# Patient Record
Sex: FEMALE | Race: WHITE | NOT HISPANIC OR LATINO
[De-identification: names, ages, dates, MRNs, and addresses within clinical notes are randomized per-mention and may not be internally consistent; named-entity substitution may affect disease eponyms.]

---

## 2017-01-28 ENCOUNTER — NON-APPOINTMENT (OUTPATIENT)
Age: 47
End: 2017-01-28

## 2017-01-28 ENCOUNTER — APPOINTMENT (OUTPATIENT)
Dept: INTERNAL MEDICINE | Facility: CLINIC | Age: 47
End: 2017-01-28

## 2017-01-28 VITALS
HEART RATE: 91 BPM | RESPIRATION RATE: 14 BRPM | SYSTOLIC BLOOD PRESSURE: 100 MMHG | BODY MASS INDEX: 29.83 KG/M2 | OXYGEN SATURATION: 96 % | HEIGHT: 61 IN | WEIGHT: 158 LBS | DIASTOLIC BLOOD PRESSURE: 80 MMHG

## 2017-02-01 LAB
25(OH)D3 SERPL-MCNC: 21 NG/ML
ALBUMIN SERPL ELPH-MCNC: 4.2 G/DL
ALP BLD-CCNC: 52 U/L
ALT SERPL-CCNC: 19 U/L
ANION GAP SERPL CALC-SCNC: 16 MMOL/L
APPEARANCE: CLEAR
AST SERPL-CCNC: 20 U/L
BASOPHILS # BLD AUTO: 0.01 K/UL
BASOPHILS NFR BLD AUTO: 0.1 %
BILIRUB SERPL-MCNC: 0.8 MG/DL
BILIRUBIN URINE: NEGATIVE
BLOOD URINE: NEGATIVE
BUN SERPL-MCNC: 9 MG/DL
CALCIUM SERPL-MCNC: 9.2 MG/DL
CHLORIDE SERPL-SCNC: 101 MMOL/L
CHOLEST SERPL-MCNC: 157 MG/DL
CHOLEST/HDLC SERPL: 2.5 RATIO
CO2 SERPL-SCNC: 24 MMOL/L
COLOR: YELLOW
CREAT SERPL-MCNC: 0.78 MG/DL
EOSINOPHIL # BLD AUTO: 0.2 K/UL
EOSINOPHIL NFR BLD AUTO: 3 %
ESTIMATED AVERAGE GLUCOSE: 103 MG/DL
FOLATE SERPL-MCNC: 17.3 NG/ML
GLUCOSE QUALITATIVE U: NORMAL MG/DL
GLUCOSE SERPL-MCNC: 89 MG/DL
HBA1C MFR BLD HPLC: 5.2 %
HCT VFR BLD CALC: 42.5 %
HDLC SERPL-MCNC: 63 MG/DL
HGB BLD-MCNC: 14.7 G/DL
IMM GRANULOCYTES NFR BLD AUTO: 0.1 %
KETONES URINE: ABNORMAL
LDLC SERPL CALC-MCNC: 81 MG/DL
LEUKOCYTE ESTERASE URINE: NEGATIVE
LYMPHOCYTES # BLD AUTO: 1.73 K/UL
LYMPHOCYTES NFR BLD AUTO: 25.8 %
MAN DIFF?: NORMAL
MCHC RBC-ENTMCNC: 32 PG
MCHC RBC-ENTMCNC: 34.6 GM/DL
MCV RBC AUTO: 92.6 FL
MONOCYTES # BLD AUTO: 0.56 K/UL
MONOCYTES NFR BLD AUTO: 8.4 %
NEUTROPHILS # BLD AUTO: 4.19 K/UL
NEUTROPHILS NFR BLD AUTO: 62.6 %
NITRITE URINE: NEGATIVE
PH URINE: 6
PLATELET # BLD AUTO: 363 K/UL
POTASSIUM SERPL-SCNC: 4.6 MMOL/L
PROT SERPL-MCNC: 7 G/DL
PROTEIN URINE: NEGATIVE MG/DL
RBC # BLD: 4.59 M/UL
RBC # FLD: 12.7 %
SODIUM SERPL-SCNC: 141 MMOL/L
SPECIFIC GRAVITY URINE: 1.02
TRIGL SERPL-MCNC: 67 MG/DL
TSH SERPL-ACNC: 1.09 UIU/ML
UROBILINOGEN URINE: 1 MG/DL
VIT B12 SERPL-MCNC: 423 PG/ML
WBC # FLD AUTO: 6.7 K/UL

## 2017-02-27 ENCOUNTER — APPOINTMENT (OUTPATIENT)
Dept: COLORECTAL SURGERY | Facility: CLINIC | Age: 47
End: 2017-02-27

## 2017-05-21 ENCOUNTER — TRANSCRIPTION ENCOUNTER (OUTPATIENT)
Age: 47
End: 2017-05-21

## 2018-01-29 ENCOUNTER — APPOINTMENT (OUTPATIENT)
Dept: INTERNAL MEDICINE | Facility: CLINIC | Age: 48
End: 2018-01-29
Payer: COMMERCIAL

## 2018-01-29 VITALS
SYSTOLIC BLOOD PRESSURE: 124 MMHG | HEART RATE: 74 BPM | WEIGHT: 170 LBS | TEMPERATURE: 98.8 F | DIASTOLIC BLOOD PRESSURE: 66 MMHG | HEIGHT: 61.5 IN | OXYGEN SATURATION: 97 % | BODY MASS INDEX: 31.69 KG/M2 | RESPIRATION RATE: 14 BRPM

## 2018-01-29 DIAGNOSIS — Z87.09 PERSONAL HISTORY OF OTHER DISEASES OF THE RESPIRATORY SYSTEM: ICD-10-CM

## 2018-01-29 DIAGNOSIS — J06.9 ACUTE UPPER RESPIRATORY INFECTION, UNSPECIFIED: ICD-10-CM

## 2018-01-29 PROCEDURE — 99406 BEHAV CHNG SMOKING 3-10 MIN: CPT

## 2018-01-29 PROCEDURE — 99213 OFFICE O/P EST LOW 20 MIN: CPT | Mod: 25

## 2018-01-29 PROCEDURE — 99396 PREV VISIT EST AGE 40-64: CPT | Mod: 25

## 2018-01-30 LAB
25(OH)D3 SERPL-MCNC: 25.9 NG/ML
ALBUMIN SERPL ELPH-MCNC: 4.1 G/DL
ALP BLD-CCNC: 58 U/L
ALT SERPL-CCNC: 11 U/L
ANION GAP SERPL CALC-SCNC: 12 MMOL/L
AST SERPL-CCNC: 12 U/L
BASOPHILS # BLD AUTO: 0.03 K/UL
BASOPHILS NFR BLD AUTO: 0.4 %
BILIRUB SERPL-MCNC: 0.2 MG/DL
BUN SERPL-MCNC: 11 MG/DL
CALCIUM SERPL-MCNC: 9.5 MG/DL
CHLORIDE SERPL-SCNC: 103 MMOL/L
CHOLEST SERPL-MCNC: 169 MG/DL
CHOLEST/HDLC SERPL: 2.6 RATIO
CO2 SERPL-SCNC: 24 MMOL/L
CREAT SERPL-MCNC: 0.83 MG/DL
EOSINOPHIL # BLD AUTO: 0.3 K/UL
EOSINOPHIL NFR BLD AUTO: 4.5 %
GLUCOSE SERPL-MCNC: 95 MG/DL
HBA1C MFR BLD HPLC: 5.1 %
HCT VFR BLD CALC: 43.1 %
HDLC SERPL-MCNC: 64 MG/DL
HGB BLD-MCNC: 14.9 G/DL
HIV1+2 AB SPEC QL IA.RAPID: NONREACTIVE
IMM GRANULOCYTES NFR BLD AUTO: 0.1 %
LDLC SERPL CALC-MCNC: 96 MG/DL
LYMPHOCYTES # BLD AUTO: 1.98 K/UL
LYMPHOCYTES NFR BLD AUTO: 29.4 %
MAN DIFF?: NORMAL
MCHC RBC-ENTMCNC: 31.9 PG
MCHC RBC-ENTMCNC: 34.6 GM/DL
MCV RBC AUTO: 92.3 FL
MONOCYTES # BLD AUTO: 0.58 K/UL
MONOCYTES NFR BLD AUTO: 8.6 %
NEUTROPHILS # BLD AUTO: 3.84 K/UL
NEUTROPHILS NFR BLD AUTO: 57 %
PLATELET # BLD AUTO: 313 K/UL
POTASSIUM SERPL-SCNC: 5.1 MMOL/L
PROT SERPL-MCNC: 7.5 G/DL
RBC # BLD: 4.67 M/UL
RBC # FLD: 12.1 %
SODIUM SERPL-SCNC: 139 MMOL/L
TRIGL SERPL-MCNC: 43 MG/DL
TSH SERPL-ACNC: 1.5 UIU/ML
WBC # FLD AUTO: 6.74 K/UL

## 2018-04-05 ENCOUNTER — RX RENEWAL (OUTPATIENT)
Age: 48
End: 2018-04-05

## 2018-08-12 ENCOUNTER — TRANSCRIPTION ENCOUNTER (OUTPATIENT)
Age: 48
End: 2018-08-12

## 2019-02-09 ENCOUNTER — TRANSCRIPTION ENCOUNTER (OUTPATIENT)
Age: 49
End: 2019-02-09

## 2019-02-11 ENCOUNTER — APPOINTMENT (OUTPATIENT)
Dept: INTERNAL MEDICINE | Facility: CLINIC | Age: 49
End: 2019-02-11
Payer: COMMERCIAL

## 2019-02-11 ENCOUNTER — INPATIENT (INPATIENT)
Facility: HOSPITAL | Age: 49
LOS: 1 days | Discharge: HOME | DRG: 392 | End: 2019-02-13
Attending: INTERNAL MEDICINE | Admitting: HOSPITALIST
Payer: COMMERCIAL

## 2019-02-11 VITALS
SYSTOLIC BLOOD PRESSURE: 127 MMHG | TEMPERATURE: 99 F | WEIGHT: 212.08 LBS | OXYGEN SATURATION: 98 % | HEIGHT: 65 IN | RESPIRATION RATE: 15 BRPM | HEART RATE: 87 BPM | DIASTOLIC BLOOD PRESSURE: 91 MMHG

## 2019-02-11 VITALS
TEMPERATURE: 98.3 F | DIASTOLIC BLOOD PRESSURE: 84 MMHG | SYSTOLIC BLOOD PRESSURE: 122 MMHG | RESPIRATION RATE: 14 BRPM | WEIGHT: 180 LBS | HEART RATE: 78 BPM | HEIGHT: 61.5 IN | OXYGEN SATURATION: 97 % | BODY MASS INDEX: 33.55 KG/M2

## 2019-02-11 DIAGNOSIS — K60.3 ANAL FISTULA: Chronic | ICD-10-CM

## 2019-02-11 DIAGNOSIS — K52.9 NONINFECTIVE GASTROENTERITIS AND COLITIS, UNSPECIFIED: ICD-10-CM

## 2019-02-11 DIAGNOSIS — R10.814 LEFT LOWER QUADRANT ABDOMINAL TENDERNESS: ICD-10-CM

## 2019-02-11 DIAGNOSIS — Z29.9 ENCOUNTER FOR PROPHYLACTIC MEASURES, UNSPECIFIED: ICD-10-CM

## 2019-02-11 DIAGNOSIS — K92.2 GASTROINTESTINAL HEMORRHAGE, UNSPECIFIED: ICD-10-CM

## 2019-02-11 DIAGNOSIS — Z11.4 ENCOUNTER FOR SCREENING FOR HUMAN IMMUNODEFICIENCY VIRUS [HIV]: ICD-10-CM

## 2019-02-11 LAB
ALBUMIN SERPL ELPH-MCNC: 3.3 G/DL — SIGNIFICANT CHANGE UP (ref 3.3–5)
ALP SERPL-CCNC: 62 U/L — SIGNIFICANT CHANGE UP (ref 40–120)
ALT FLD-CCNC: 25 U/L — SIGNIFICANT CHANGE UP (ref 12–78)
ANION GAP SERPL CALC-SCNC: 7 MMOL/L — SIGNIFICANT CHANGE UP (ref 5–17)
APTT BLD: 29.1 SEC — SIGNIFICANT CHANGE UP (ref 28.5–37)
AST SERPL-CCNC: 9 U/L — LOW (ref 15–37)
BASOPHILS # BLD AUTO: 0.02 K/UL — SIGNIFICANT CHANGE UP (ref 0–0.2)
BASOPHILS NFR BLD AUTO: 0.2 % — SIGNIFICANT CHANGE UP (ref 0–2)
BILIRUB SERPL-MCNC: 0.6 MG/DL — SIGNIFICANT CHANGE UP (ref 0.2–1.2)
BUN SERPL-MCNC: 8 MG/DL — SIGNIFICANT CHANGE UP (ref 7–23)
CALCIUM SERPL-MCNC: 8.4 MG/DL — LOW (ref 8.5–10.1)
CHLORIDE SERPL-SCNC: 104 MMOL/L — SIGNIFICANT CHANGE UP (ref 96–108)
CO2 SERPL-SCNC: 29 MMOL/L — SIGNIFICANT CHANGE UP (ref 22–31)
CREAT SERPL-MCNC: 0.68 MG/DL — SIGNIFICANT CHANGE UP (ref 0.5–1.3)
EOSINOPHIL # BLD AUTO: 0.19 K/UL — SIGNIFICANT CHANGE UP (ref 0–0.5)
EOSINOPHIL NFR BLD AUTO: 1.7 % — SIGNIFICANT CHANGE UP (ref 0–6)
GLUCOSE SERPL-MCNC: 88 MG/DL — SIGNIFICANT CHANGE UP (ref 70–99)
HCT VFR BLD CALC: 42.7 % — SIGNIFICANT CHANGE UP (ref 34.5–45)
HGB BLD-MCNC: 14.6 G/DL — SIGNIFICANT CHANGE UP (ref 11.5–15.5)
IMM GRANULOCYTES NFR BLD AUTO: 0.3 % — SIGNIFICANT CHANGE UP (ref 0–1.5)
INR BLD: 1.14 RATIO — SIGNIFICANT CHANGE UP (ref 0.88–1.16)
LYMPHOCYTES # BLD AUTO: 2.42 K/UL — SIGNIFICANT CHANGE UP (ref 1–3.3)
LYMPHOCYTES # BLD AUTO: 21.1 % — SIGNIFICANT CHANGE UP (ref 13–44)
MCHC RBC-ENTMCNC: 31.1 PG — SIGNIFICANT CHANGE UP (ref 27–34)
MCHC RBC-ENTMCNC: 34.2 GM/DL — SIGNIFICANT CHANGE UP (ref 32–36)
MCV RBC AUTO: 91 FL — SIGNIFICANT CHANGE UP (ref 80–100)
MONOCYTES # BLD AUTO: 0.84 K/UL — SIGNIFICANT CHANGE UP (ref 0–0.9)
MONOCYTES NFR BLD AUTO: 7.3 % — SIGNIFICANT CHANGE UP (ref 2–14)
NEUTROPHILS # BLD AUTO: 7.97 K/UL — HIGH (ref 1.8–7.4)
NEUTROPHILS NFR BLD AUTO: 69.4 % — SIGNIFICANT CHANGE UP (ref 43–77)
NRBC # BLD: 0 /100 WBCS — SIGNIFICANT CHANGE UP (ref 0–0)
PLATELET # BLD AUTO: 361 K/UL — SIGNIFICANT CHANGE UP (ref 150–400)
POTASSIUM SERPL-MCNC: 3.6 MMOL/L — SIGNIFICANT CHANGE UP (ref 3.5–5.3)
POTASSIUM SERPL-SCNC: 3.6 MMOL/L — SIGNIFICANT CHANGE UP (ref 3.5–5.3)
PROT SERPL-MCNC: 7.5 G/DL — SIGNIFICANT CHANGE UP (ref 6–8.3)
PROTHROM AB SERPL-ACNC: 13.1 SEC — HIGH (ref 10–12.9)
RBC # BLD: 4.69 M/UL — SIGNIFICANT CHANGE UP (ref 3.8–5.2)
RBC # FLD: 12.5 % — SIGNIFICANT CHANGE UP (ref 10.3–14.5)
SODIUM SERPL-SCNC: 140 MMOL/L — SIGNIFICANT CHANGE UP (ref 135–145)
WBC # BLD: 11.48 K/UL — HIGH (ref 3.8–10.5)
WBC # FLD AUTO: 11.48 K/UL — HIGH (ref 3.8–10.5)

## 2019-02-11 PROCEDURE — 93010 ELECTROCARDIOGRAM REPORT: CPT

## 2019-02-11 PROCEDURE — 74177 CT ABD & PELVIS W/CONTRAST: CPT | Mod: 26

## 2019-02-11 PROCEDURE — 99222 1ST HOSP IP/OBS MODERATE 55: CPT | Mod: GC,AI

## 2019-02-11 PROCEDURE — 99214 OFFICE O/P EST MOD 30 MIN: CPT

## 2019-02-11 PROCEDURE — 99285 EMERGENCY DEPT VISIT HI MDM: CPT

## 2019-02-11 RX ORDER — SODIUM CHLORIDE 9 MG/ML
1000 INJECTION INTRAMUSCULAR; INTRAVENOUS; SUBCUTANEOUS ONCE
Qty: 0 | Refills: 0 | Status: COMPLETED | OUTPATIENT
Start: 2019-02-11 | End: 2019-02-11

## 2019-02-11 RX ORDER — MORPHINE SULFATE 50 MG/1
1 CAPSULE, EXTENDED RELEASE ORAL EVERY 4 HOURS
Qty: 0 | Refills: 0 | Status: DISCONTINUED | OUTPATIENT
Start: 2019-02-11 | End: 2019-02-13

## 2019-02-11 RX ORDER — METRONIDAZOLE 500 MG
500 TABLET ORAL ONCE
Qty: 0 | Refills: 0 | Status: COMPLETED | OUTPATIENT
Start: 2019-02-11 | End: 2019-02-11

## 2019-02-11 RX ORDER — MORPHINE SULFATE 50 MG/1
4 CAPSULE, EXTENDED RELEASE ORAL ONCE
Qty: 0 | Refills: 0 | Status: DISCONTINUED | OUTPATIENT
Start: 2019-02-11 | End: 2019-02-11

## 2019-02-11 RX ORDER — IOHEXOL 300 MG/ML
30 INJECTION, SOLUTION INTRAVENOUS ONCE
Qty: 0 | Refills: 0 | Status: COMPLETED | OUTPATIENT
Start: 2019-02-11 | End: 2019-02-11

## 2019-02-11 RX ORDER — METRONIDAZOLE 500 MG
500 TABLET ORAL EVERY 8 HOURS
Qty: 0 | Refills: 0 | Status: DISCONTINUED | OUTPATIENT
Start: 2019-02-11 | End: 2019-02-13

## 2019-02-11 RX ORDER — LACTOBACILLUS ACIDOPHILUS 100MM CELL
1 CAPSULE ORAL
Qty: 0 | Refills: 0 | Status: DISCONTINUED | OUTPATIENT
Start: 2019-02-11 | End: 2019-02-13

## 2019-02-11 RX ORDER — ONDANSETRON 8 MG/1
4 TABLET, FILM COATED ORAL ONCE
Qty: 0 | Refills: 0 | Status: COMPLETED | OUTPATIENT
Start: 2019-02-11 | End: 2019-02-11

## 2019-02-11 RX ORDER — VANCOMYCIN HCL 1 G
125 VIAL (EA) INTRAVENOUS EVERY 6 HOURS
Qty: 0 | Refills: 0 | Status: DISCONTINUED | OUTPATIENT
Start: 2019-02-11 | End: 2019-02-13

## 2019-02-11 RX ORDER — ACETAMINOPHEN 500 MG
650 TABLET ORAL EVERY 6 HOURS
Qty: 0 | Refills: 0 | Status: DISCONTINUED | OUTPATIENT
Start: 2019-02-11 | End: 2019-02-13

## 2019-02-11 RX ORDER — ONDANSETRON 8 MG/1
4 TABLET, FILM COATED ORAL EVERY 6 HOURS
Qty: 0 | Refills: 0 | Status: DISCONTINUED | OUTPATIENT
Start: 2019-02-11 | End: 2019-02-13

## 2019-02-11 RX ORDER — MORPHINE SULFATE 50 MG/1
2 CAPSULE, EXTENDED RELEASE ORAL EVERY 6 HOURS
Qty: 0 | Refills: 0 | Status: DISCONTINUED | OUTPATIENT
Start: 2019-02-11 | End: 2019-02-13

## 2019-02-11 RX ADMIN — MORPHINE SULFATE 4 MILLIGRAM(S): 50 CAPSULE, EXTENDED RELEASE ORAL at 20:40

## 2019-02-11 RX ADMIN — Medication 100 MILLIGRAM(S): at 20:40

## 2019-02-11 RX ADMIN — ONDANSETRON 4 MILLIGRAM(S): 8 TABLET, FILM COATED ORAL at 17:18

## 2019-02-11 RX ADMIN — Medication 1 TABLET(S): at 19:53

## 2019-02-11 RX ADMIN — SODIUM CHLORIDE 1000 MILLILITER(S): 9 INJECTION INTRAMUSCULAR; INTRAVENOUS; SUBCUTANEOUS at 20:38

## 2019-02-11 RX ADMIN — MORPHINE SULFATE 4 MILLIGRAM(S): 50 CAPSULE, EXTENDED RELEASE ORAL at 18:19

## 2019-02-11 RX ADMIN — MORPHINE SULFATE 4 MILLIGRAM(S): 50 CAPSULE, EXTENDED RELEASE ORAL at 17:18

## 2019-02-11 RX ADMIN — SODIUM CHLORIDE 1000 MILLILITER(S): 9 INJECTION INTRAMUSCULAR; INTRAVENOUS; SUBCUTANEOUS at 17:18

## 2019-02-11 RX ADMIN — MORPHINE SULFATE 4 MILLIGRAM(S): 50 CAPSULE, EXTENDED RELEASE ORAL at 20:33

## 2019-02-11 RX ADMIN — Medication 125 MILLIGRAM(S): at 19:53

## 2019-02-11 RX ADMIN — IOHEXOL 30 MILLILITER(S): 300 INJECTION, SOLUTION INTRAVENOUS at 17:18

## 2019-02-11 NOTE — ED ADULT NURSE NOTE - NSIMPLEMENTINTERV_GEN_ALL_ED
Implemented All Universal Safety Interventions:  [place] to call system. Call bell, personal items and telephone within reach. Instruct patient to call for assistance. Room bathroom lighting operational. Non-slip footwear when patient is off stretcher. Physically safe environment: no spills, clutter or unnecessary equipment. Stretcher in lowest position, wheels locked, appropriate side rails in place.

## 2019-02-11 NOTE — H&P ADULT - PROBLEM SELECTOR PLAN 1
-Likely infectious, r/o Cdiff colitis   -Patient with recent antibiotic use: -Pharmacy closed on admission, morning team to call to determine which antibiotic patient used for URI for 9 days.   -CT scan showed: Thickening of the mid to distal descending colon with pericolonic infiltrative changes, compatible with colitis  -WBC mildly elevated at 11, afebrile, F/u procalcitonin in the AM   -F/U CBC/CMP AM   -S/p 1 dose Flagyl in the ED   -PO Vancomycin   -Pain control: mild: Tylenol, moderate: morphine 1 severe: morphine 2 mg   -Clear Liquid Diet   -GI Dr. Watts following -Likely infectious, r/o Cdiff colitis   -Patient with recent antibiotic use: -Pharmacy closed on admission, morning team to call to determine which antibiotic patient used for URI for 9 days.   -CT scan showed: Thickening of the mid to distal descending colon with pericolonic infiltrative changes, compatible with colitis  -WBC mildly elevated at 11, afebrile, F/u procalcitonin in the AM   -F/U CBC/CMP AM   -S/p 1 dose Flagyl in the ED   -PO Vancomycin   -Pain control: mild: Tylenol, moderate: morphine 1 severe: morphine 2 mg   -Zofran IV 4 mg PRN Q6 for nausea   -Bacid TID   -Clear Liquid Diet   -GI Dr. Watts following -Likely infectious, recent ABX, recent travel to [facility], also recent ingestion of seafood at [employer]  DDX:  Cdiff colitis   -Patient with recent antibiotic use: -Pharmacy closed on admission, morning team to call to determine which antibiotic patient used for URI for 9 days.   -CT scan showed: Thickening of the mid to distal descending colon with pericolonic infiltrative changes, compatible with colitis  -WBC mildly elevated at 11, afebrile, F/u procalcitonin in the AM   -F/U CBC/CMP AM   -S/p 1 dose Flagyl in the ED will continue   -Continue PO Vancomycin   -Pain control: mild: Tylenol, moderate: morphine 1 severe: morphine 2 mg   -Zofran IV 4 mg PRN Q6 for nausea   -Bacid TID   -Clear Liquid Diet   -GI Dr. Watts following

## 2019-02-11 NOTE — ASSESSMENT
[FreeTextEntry1] : Lower GI bleed: Has had nannette blood with each bowel movement. Moderate TTP over abdomen. Urged patient to go to [facility]. Discussed with Evan (triage). \par \par

## 2019-02-11 NOTE — H&P ADULT - NSHPSOCIALHISTORY_GEN_ALL_CORE
Former smoker, smoked for 20 years 5 cigarettes per day, quit 1 year ago   ETOH social use   Lives at home alone, works at [employer]

## 2019-02-11 NOTE — ED PROVIDER NOTE - PHYSICAL EXAMINATION
Gen: Alert, NAD  Head/eyes: NC/AT, PERRL, EOMI, normal lids/conjunctiva, no scleral icterus  ENT: Bilateral TM WNL, normal hearing, patent oropharynx without erythema/exudate, uvula midline, no peritonsillar abscess, no tongue/uvula swelling  Neck: supple, no tenderness/meningismus/JVD, Trachea midline  Pulm: Bilateral clear BS, normal resp effort, no wheeze/stridor/retractions  CV: RRR, no M/R/G, +2 dist pulses (radial, pedal DP/PT, popliteal)  Abd: soft, +ttp LLQ/ND, +BS, no guarding/rebound tenderness  Musculoskeletal: no edema/erythema/cyanosis, FROM in all extremities, no C/T/L spine ttp  Skin: no rash, no vesicles, no petechaie, no ecchymosis, no swelling  Neuro: AAOx3, CN 2-12 intact, normal sensation, 5/5 motor strength in all extremities, normal gait, no dysmetria

## 2019-02-11 NOTE — H&P ADULT - NSHPREVIEWOFSYSTEMS_GEN_ALL_CORE
CONSTITUTIONAL: denies fever, chills, fatigue, weakness  HEENT: denies blurred visions, sore throat  SKIN: denies new lesions, rash  CARDIOVASCULAR: denies chest pain, chest pressure, palpitations  RESPIRATORY: denies shortness of breath, sputum production  GASTROINTESTINAL: denies nausea, vomiting, diarrhea, abdominal pain  GENITOURINARY: denies dysuria, discharge  NEUROLOGICAL: denies numbness, headache, focal weakness  MUSCULOSKELETAL: denies new joint pain, muscle aches  HEMATOLOGIC: denies gross bleeding, bruising  LYMPHATICS: denies enlarged lymph nodes, extremity swelling  PSYCHIATRIC: denies recent changes in anxiety, depression  ENDOCRINOLOGIC: denies sweating, cold or heat intolerance CONSTITUTIONAL: denies fever, chills   HEENT: denies blurred visions  SKIN: denies new lesions, rash  CARDIOVASCULAR: denies chest pain, chest pressure, palpitations  RESPIRATORY: denies shortness of breath  GASTROINTESTINAL: + nausea, vomiting, diarrhea, abdominal pain  GENITOURINARY: denies dysuria, discharge  NEUROLOGICAL: denies numbness, headache, focal weakness

## 2019-02-11 NOTE — H&P ADULT - ASSESSMENT
48 year old F with PMHx of hemorrhoids and anal fistula who presented with c/o watery, dark diarrhea x2 days, admits to recent abx use for URI symptoms, admitted for colitis. 48 year old F with PSH of hemorrhoidectomy anal fistulotomy who presented with c/o watery, dark diarrhea x2 days, admits to recent abx use for URI symptoms, admitted for colitis.

## 2019-02-11 NOTE — CONSULT NOTE ADULT - SUBJECTIVE AND OBJECTIVE BOX
Chief Complaint:  Patient is a 48y old  Female who presents with a chief complaint of   · Chief Complaint: The patient is a 48y Female complaining of rectal bleeding.	  · HPI Objective Statement: 49 yo female c/o watery, dark diarrhea x 2 days, admits to recent abx use for URI symptoms.  Recently finished course of prednisone.  No fever/chills.  c/o left lower abdominal pain with associated nausea. Admits to >5 episodes of diarrhea today. No vomiting.  PMD Dr. Spivey	  · Presenting Symptoms: DIARRHEA, NAUSEA, PAIN	  · Negative Findings: no fever	  · Location: abdomen	  · Laterality: left	  · Area: lower	  · Radiation: no radiation	  · Timing: gradual onset	  · Duration: day(s)  2	  · Quality: diarrhea/mucous	  on abs for one week now here for follow up    Allergies:  No Known Allergies      Medications:      PMHX/PSHX:  Hemorrhoids  Anal fistula  Anal fistula      Family history:  History of hypertension (Mother)  Diabetes mellitus (Mother)      Social History: single no children recent trip to [facility] as well no pets     ROS:     General:  No wt loss, fevers, chills, night sweats, fatigue,   Eyes:  Good vision, no reported pain  ENT:  No sore throat, pain, runny nose, dysphagia  CV:  No pain, palpitations, hypo/hypertension  Resp:  No dyspnea, cough, tachypnea, wheezing  GI:  No pain, No nausea, No vomiting, No diarrhea, No constipation, No weight loss, No fever, No pruritis, No rectal bleeding, No tarry stools, No dysphagia,  :  No pain, bleeding, incontinence, nocturia  Muscle:  No pain, weakness  Neuro:  No weakness, tingling, memory problems  Psych:  No fatigue, insomnia, mood problems, depression  Endocrine:  No polyuria, polydipsia, cold/heat intolerance  Heme:  No petechiae, ecchymosis, easy bruisability  Skin:  No rash, tattoos, scars, edema      PHYSICAL EXAM:   Vital Signs:  Vital Signs Last 24 Hrs  T(C): 36.8 (11 Feb 2019 18:00), Max: 37.2 (11 Feb 2019 16:12)  T(F): 98.3 (11 Feb 2019 18:00), Max: 99 (11 Feb 2019 16:12)  HR: 78 (11 Feb 2019 18:00) (78 - 87)  BP: 110/70 (11 Feb 2019 18:00) (110/70 - 127/91)  BP(mean): --  RR: 15 (11 Feb 2019 18:00) (15 - 15)  SpO2: 99% (11 Feb 2019 18:00) (98% - 99%)  Daily Height in cm: 154.94 (11 Feb 2019 16:12)    Daily     GENERAL:  Appears stated age, well-groomed, well-nourished, no distress  HEENT:  NC/AT,  conjunctivae clear and pink, no thyromegaly, nodules, adenopathy, no JVD, sclera -anicteric  CHEST:  Full & symmetric excursion, no increased effort, breath sounds clear  HEART:  Regular rhythm, S1, S2, no murmur/rub/S3/S4, no abdominal bruit, no edema  ABDOMEN:  Soft, non-tender, non-distended, normoactive bowel sounds,  no masses ,no hepato-splenomegaly, no signs of chronic liver disease  EXTEREMITIES:  no cyanosis,clubbing or edema  SKIN:  No rash/erythema/ecchymoses/petechiae/wounds/abscess/warm/dry  NEURO:  Alert, oriented, no asterixis, no tremor, no encephalopathy    LABS:                        14.6   11.48 )-----------( 361      ( 11 Feb 2019 17:01 )             42.7     02-11    140  |  104  |  8   ----------------------------<  88  3.6   |  29  |  0.68    Ca    8.4<L>      11 Feb 2019 17:01    TPro  7.5  /  Alb  3.3  /  TBili  0.6  /  DBili  x   /  AST  9<L>  /  ALT  25  /  AlkPhos  62  02-11    LIVER FUNCTIONS - ( 11 Feb 2019 17:01 )  Alb: 3.3 g/dL / Pro: 7.5 g/dL / ALK PHOS: 62 U/L / ALT: 25 U/L / AST: 9 U/L / GGT: x           PT/INR - ( 11 Feb 2019 17:01 )   PT: 13.1 sec;   INR: 1.14 ratio         PTT - ( 11 Feb 2019 17:01 )  PTT:29.1 sec        Imaging:

## 2019-02-11 NOTE — H&P ADULT - NSHPPHYSICALEXAM_GEN_ALL_CORE
T(C): 36.8 (02-11-19 @ 18:00), Max: 37.2 (02-11-19 @ 16:12)  HR: 78 (02-11-19 @ 18:00) (78 - 87)  BP: 110/70 (02-11-19 @ 18:00) (110/70 - 127/91)  RR: 15 (02-11-19 @ 18:00) (15 - 15)  SpO2: 99% (02-11-19 @ 18:00) (98% - 99%)    GENERAL: patient appears well, no acute distress, appropriate, pleasant  EYES: sclera clear, no exudates  ENMT: oropharynx clear without erythema, no exudates, moist mucous membranes  NECK: supple, soft, no thyromegaly noted  LUNGS: good air entry bilaterally, clear to auscultation, symmetric breath sounds, no wheezing or rhonchi appreciated  HEART: soft S1/S2, regular rate and rhythm, no murmurs noted, no lower extremity edema  GASTROINTESTINAL: abdomen is soft, nontender, nondistended, normoactive bowel sounds, no palpable masses  INTEGUMENT: good skin turgor, no lesions noted  MUSCULOSKELETAL: no clubbing or cyanosis, no obvious deformity  NEUROLOGIC: awake, alert, oriented x3, good muscle tone in 4 extremities, no obvious sensory deficits  PSYCHIATRIC: mood is good, affect is congruent, linear and logical thought process  HEME/LYMPH: no palpable supraclavicular nodules, no obvious ecchymosis or petechiae T(C): 36.8 (02-11-19 @ 18:00), Max: 37.2 (02-11-19 @ 16:12)  HR: 78 (02-11-19 @ 18:00) (78 - 87)  BP: 110/70 (02-11-19 @ 18:00) (110/70 - 127/91)  RR: 15 (02-11-19 @ 18:00) (15 - 15)  SpO2: 99% (02-11-19 @ 18:00) (98% - 99%)    GENERAL: patient appears to be in moderate distress   EYES: sclera clear, no exudates  LUNGS: good air entry bilaterally, clear to auscultation, symmetric breath sounds, no wheezing or rhonchi appreciated  HEART: soft S1/S2, regular rate and rhythm, no murmurs noted, no lower extremity edema  GASTROINTESTINAL: abdomen is soft, tender to palpation of lower quadrant, nondistended, normoactive bowel sounds, no palpable masses  MUSCULOSKELETAL: no clubbing or cyanosis, no obvious deformity  NEUROLOGIC: awake, alert, oriented x3  PSYCHIATRIC: mood is good, affect is congruent, linear and logical thought process

## 2019-02-11 NOTE — REVIEW OF SYSTEMS
[Fever] : no fever [Chills] : no chills [Night Sweats] : no night sweats [Chest Pain] : no chest pain [Palpitations] : no palpitations [Lower Ext Edema] : no lower extremity edema [Shortness Of Breath] : no shortness of breath [Wheezing] : no wheezing [Cough] : no cough [Dyspnea on Exertion] : no dyspnea on exertion [Abdominal Pain] : abdominal pain [Nausea] : no nausea [Constipation] : no constipation [Diarrhea] : diarrhea [Vomiting] : no vomiting

## 2019-02-11 NOTE — H&P ADULT - FAMILY HISTORY
Mother  Still living? Unknown  Diabetes mellitus, Age at diagnosis: Age Unknown  History of hypertension, Age at diagnosis: Age Unknown

## 2019-02-11 NOTE — H&P ADULT - HISTORY OF PRESENT ILLNESS
48 year old F with PMHx of hemorrhoids and anal fistula who presented with c/o watery, dark diarrhea x2 days, admits to recent abx use for URI symptoms. Recently finished course of prednisone.  No fever/chills.  c/o left lower abdominal pain with associated nausea. Admits to >5 episodes of diarrhea today. No vomiting.     In the ED, the patient's vital signs were T: 99, HR 87 bpm, /91, R: 15, SpO2 98% on RA. WBC 11.48, H/H 14.6/42.7, Plt 361. PT/INR/PTT 13.1/1.14/29.1. CMP WNL. HCG serum 2. EKG______________. CT A/P: Thickening of the mid to distal descending colon with pericolonic infiltrative changes, compatible with colitis, differential includes infectious versus inflammatory versus ischemic etiology given segment of involved colon. Normal appendix. No bowel obstruction. 4.7 cm right ovarian cyst 48 year old F with PMHx of hemorrhoids and anal fistula who presented with c/o watery, dark diarrhea x2 days, admits to recent abx use for URI symptoms. Recently finished course of prednisone.  No fever/chills.  c/o left lower abdominal pain with associated nausea. Admits to >5 episodes of diarrhea today. No vomiting.     In the ED, the patient's vital signs were T: 99, HR 87 bpm, /91, R: 15, SpO2 98% on RA. WBC 11.48, H/H 14.6/42.7, Plt 361. PT/INR/PTT 13.1/1.14/29.1. CMP WNL. HCG serum 2. EKG______________. CT A/P: Thickening of the mid to distal descending colon with pericolonic infiltrative changes, compatible with colitis, differential includes infectious versus inflammatory versus ischemic etiology given segment of involved colon. Normal appendix. No bowel obstruction. 4.7 cm right ovarian cyst.   She received Flagyl 500mg IV x1, 1L NS bolus x1, 4mg morphine x2, Zofran x1. Dr. Watts evaluated the patient in the ED and started patient on PO Vancomycin, CLD diet and bacid. FOBT ordered in ED, pending collection. 48 year old F with PMHx of hemorrhoids and anal fistula who presented with complaint of abdominal pain and dark red diarrhea. Patient states that she had lower abdominal pain for 1 day, and over the past 12 hrs she has had 4-5 episodes of water diarrhea, dark red in color. Patient also has nausea, and one episode of NBNB vomiting.  Of note patient returned from [facility], one week ago. While at [facility], patient states she had one day of subjective fever, swollen ankles, followed by URI which she was treated for with antibiotics (patient does not recall name, completed 9/10 days) and prednisone for 5 days. Denies fever/chills.    In the ED, the patient's vital signs were T: 99, HR 87 bpm, /91, R: 15, SpO2 98% on RA. WBC 11.48, H/H 14.6/42.7, Plt 361. PT/INR/PTT 13.1/1.14/29.1. CMP WNL. HCG serum 2. EKG pending.       CT A/P: Thickening of the mid to distal descending colon with pericolonic infiltrative changes, compatible with colitis, differential includes infectious versus inflammatory versus ischemic etiology given segment of involved colon. Normal appendix. No bowel obstruction. 4.7 cm right ovarian cyst.   She received Flagyl 500mg IV x1, 1L NS bolus x1, 4mg morphine x2, Zofran x1. Dr. Watts evaluated the patient in the ED and started patient on PO Vancomycin, CLD diet and bacid. FOBT ordered in ED, pending collection. 48 year old F with PSH of hemorrhoidectomy anal fistulotomy, who presented with complaint of abdominal pain and dark red diarrhea. Patient states that she had lower abdominal pain for 1 day, and over the past 12 hrs she has had 4-5 episodes of water diarrhea, dark red in color. Patient also has nausea, and one episode of NBNB vomiting.  Of note patient returned from [facility], one week ago. While at [facility], patient states she had one day of subjective fever, swollen ankles, followed by URI which she was treated for with antibiotics (patient does not recall name, completed 9/10 days) and prednisone for 5 days. Denies fever/chills.    In the ED, the patient's vital signs were T: 99, HR 87 bpm, /91, R: 15, SpO2 98% on RA. WBC 11.48, H/H 14.6/42.7, Plt 361. PT/INR/PTT 13.1/1.14/29.1. CMP WNL. HCG serum 2. EKG pending.       CT A/P: Thickening of the mid to distal descending colon with pericolonic infiltrative changes, compatible with colitis, differential includes infectious versus inflammatory versus ischemic etiology given segment of involved colon. Normal appendix. No bowel obstruction. 4.7 cm right ovarian cyst.   She received Flagyl 500mg IV x1, 1L NS bolus x1, 4mg morphine x2, Zofran x1. Dr. Watts evaluated the patient in the ED and started patient on PO Vancomycin, CLD diet and bacid. FOBT ordered in ED, pending collection.

## 2019-02-11 NOTE — HISTORY OF PRESENT ILLNESS
[Initial Evaluation] : an initial evaluation of [Hematochezia] : hematochezia [Melena] : no melena [Fatigue] : fatigue [Weakness] : weakness [Lightheadedness] : no lightheadedness [Abdominal Pain] : abdominal pain [Currently Experiencing] : The patient is currently experiencing symptoms. [Blood Filling the Toilet] : blood filling the toilet [Sudden] : sudden [___ Day(s) Ago] : [unfilled] day(s) ago [Travel] : travel [Frequent] : frequently [Daily] : occur daily [Moderate] : moderate in severity [Worsening] : worsening [Eating] : eating [Fever] : no fever [Chills] : no chills [Nausea] : no nausea [Vomiting] : no vomiting [Hematemesis] : no hematemesis [Epistaxis] : no epistaxis [Rectal Pain] : no rectal pain [Rectal Mass] : no rectal mass [de-identified] : urgent care [de-identified] : URI symptoms [de-identified] : prednisone, doxycycline [de-identified] : prednisone

## 2019-02-11 NOTE — PHYSICAL EXAM
[No Respiratory Distress] : no respiratory distress  [Clear to Auscultation] : lungs were clear to auscultation bilaterally [No Accessory Muscle Use] : no accessory muscle use [Normal Rate] : normal rate  [Regular Rhythm] : with a regular rhythm [Normal S1, S2] : normal S1 and S2 [No Murmur] : no murmur heard [Soft] : abdomen soft [Non-distended] : non-distended [Normal Bowel Sounds] : normal bowel sounds [Normal Posterior Cervical Nodes] : no posterior cervical lymphadenopathy [Normal Anterior Cervical Nodes] : no anterior cervical lymphadenopathy [de-identified] : lying down in fetal position [de-identified] : diffuse moderate TTP

## 2019-02-11 NOTE — CONSULT NOTE ADULT - PROBLEM SELECTOR RECOMMENDATION 9
in and out  bacid one tab tid  low residue lactose free diet  stool studies  to follow  if stool negative consider imodium    check stool for c sdiff by pcr

## 2019-02-11 NOTE — H&P ADULT - PROBLEM SELECTOR PLAN 2
BB IMPROVE VTE Individual Risk Assessment          RISK                                                          Points    [  0] Previous VTE                                                3  [0  ] Thrombophilia                                             2  [ 0 ] Lower limb paralysis                                   2        (unable to hold up >15 seconds)    [0  ] Current Cancer                                            2         (within 6 months)  [ 0 ] Immobilization > 24 hrs                              1  [  0] ICU/CCU stay > 24 hours                            1  [0  ] Age > 60                                                    1    IMPROVE VTE Score _____0____  SCDs for dvt Ppx

## 2019-02-11 NOTE — ED PROVIDER NOTE - CARE PLAN
Principal Discharge DX:	Left lower quadrant abdominal tenderness without rebound tenderness Principal Discharge DX:	Colitis, acute

## 2019-02-11 NOTE — ED PROVIDER NOTE - OBJECTIVE STATEMENT
49 yo female c/o watery, dark diarrhea x 2 days, admits to recent abx use for URI symptoms.  Recently finished course of prednisone.  No fever/chills.  c/o left lower abdominal pain with associated nausea. Admits to >5 episodes of diarrhea today. No vomiting.  PMD Dr. Spivey

## 2019-02-12 LAB
ALBUMIN SERPL ELPH-MCNC: 2.9 G/DL — LOW (ref 3.3–5)
ALP SERPL-CCNC: 54 U/L — SIGNIFICANT CHANGE UP (ref 40–120)
ALT FLD-CCNC: 23 U/L — SIGNIFICANT CHANGE UP (ref 12–78)
ANION GAP SERPL CALC-SCNC: 6 MMOL/L — SIGNIFICANT CHANGE UP (ref 5–17)
AST SERPL-CCNC: 17 U/L — SIGNIFICANT CHANGE UP (ref 15–37)
BASOPHILS # BLD AUTO: 0.02 K/UL — SIGNIFICANT CHANGE UP (ref 0–0.2)
BASOPHILS NFR BLD AUTO: 0.3 % — SIGNIFICANT CHANGE UP (ref 0–2)
BILIRUB SERPL-MCNC: 0.7 MG/DL — SIGNIFICANT CHANGE UP (ref 0.2–1.2)
BUN SERPL-MCNC: 7 MG/DL — SIGNIFICANT CHANGE UP (ref 7–23)
CALCIUM SERPL-MCNC: 8 MG/DL — LOW (ref 8.5–10.1)
CHLORIDE SERPL-SCNC: 104 MMOL/L — SIGNIFICANT CHANGE UP (ref 96–108)
CO2 SERPL-SCNC: 30 MMOL/L — SIGNIFICANT CHANGE UP (ref 22–31)
CREAT SERPL-MCNC: 0.66 MG/DL — SIGNIFICANT CHANGE UP (ref 0.5–1.3)
EOSINOPHIL # BLD AUTO: 0.25 K/UL — SIGNIFICANT CHANGE UP (ref 0–0.5)
EOSINOPHIL NFR BLD AUTO: 3.2 % — SIGNIFICANT CHANGE UP (ref 0–6)
ERYTHROCYTE [SEDIMENTATION RATE] IN BLOOD: 7 MM/HR — SIGNIFICANT CHANGE UP (ref 0–15)
GLUCOSE SERPL-MCNC: 99 MG/DL — SIGNIFICANT CHANGE UP (ref 70–99)
HCT VFR BLD CALC: 39.6 % — SIGNIFICANT CHANGE UP (ref 34.5–45)
HGB BLD-MCNC: 13.4 G/DL — SIGNIFICANT CHANGE UP (ref 11.5–15.5)
HIV 1+2 AB+HIV1 P24 AG SERPL QL IA: SIGNIFICANT CHANGE UP
IMM GRANULOCYTES NFR BLD AUTO: 0.4 % — SIGNIFICANT CHANGE UP (ref 0–1.5)
LYMPHOCYTES # BLD AUTO: 2.02 K/UL — SIGNIFICANT CHANGE UP (ref 1–3.3)
LYMPHOCYTES # BLD AUTO: 26.1 % — SIGNIFICANT CHANGE UP (ref 13–44)
MCHC RBC-ENTMCNC: 31.2 PG — SIGNIFICANT CHANGE UP (ref 27–34)
MCHC RBC-ENTMCNC: 33.8 GM/DL — SIGNIFICANT CHANGE UP (ref 32–36)
MCV RBC AUTO: 92.1 FL — SIGNIFICANT CHANGE UP (ref 80–100)
MONOCYTES # BLD AUTO: 0.7 K/UL — SIGNIFICANT CHANGE UP (ref 0–0.9)
MONOCYTES NFR BLD AUTO: 9 % — SIGNIFICANT CHANGE UP (ref 2–14)
NEUTROPHILS # BLD AUTO: 4.73 K/UL — SIGNIFICANT CHANGE UP (ref 1.8–7.4)
NEUTROPHILS NFR BLD AUTO: 61 % — SIGNIFICANT CHANGE UP (ref 43–77)
NRBC # BLD: 0 /100 WBCS — SIGNIFICANT CHANGE UP (ref 0–0)
PLATELET # BLD AUTO: 331 K/UL — SIGNIFICANT CHANGE UP (ref 150–400)
POTASSIUM SERPL-MCNC: 3.8 MMOL/L — SIGNIFICANT CHANGE UP (ref 3.5–5.3)
POTASSIUM SERPL-SCNC: 3.8 MMOL/L — SIGNIFICANT CHANGE UP (ref 3.5–5.3)
PROCALCITONIN SERPL-MCNC: <0.05 — SIGNIFICANT CHANGE UP (ref 0–0.04)
PROT SERPL-MCNC: 6.8 G/DL — SIGNIFICANT CHANGE UP (ref 6–8.3)
RBC # BLD: 4.3 M/UL — SIGNIFICANT CHANGE UP (ref 3.8–5.2)
RBC # FLD: 12.7 % — SIGNIFICANT CHANGE UP (ref 10.3–14.5)
SODIUM SERPL-SCNC: 140 MMOL/L — SIGNIFICANT CHANGE UP (ref 135–145)
WBC # BLD: 7.75 K/UL — SIGNIFICANT CHANGE UP (ref 3.8–10.5)
WBC # FLD AUTO: 7.75 K/UL — SIGNIFICANT CHANGE UP (ref 3.8–10.5)

## 2019-02-12 PROCEDURE — 99233 SBSQ HOSP IP/OBS HIGH 50: CPT

## 2019-02-12 RX ORDER — INFLUENZA VIRUS VACCINE 15; 15; 15; 15 UG/.5ML; UG/.5ML; UG/.5ML; UG/.5ML
0.5 SUSPENSION INTRAMUSCULAR ONCE
Qty: 0 | Refills: 0 | Status: DISCONTINUED | OUTPATIENT
Start: 2019-02-12 | End: 2019-02-13

## 2019-02-12 RX ADMIN — MORPHINE SULFATE 2 MILLIGRAM(S): 50 CAPSULE, EXTENDED RELEASE ORAL at 06:40

## 2019-02-12 RX ADMIN — MORPHINE SULFATE 2 MILLIGRAM(S): 50 CAPSULE, EXTENDED RELEASE ORAL at 00:39

## 2019-02-12 RX ADMIN — Medication 125 MILLIGRAM(S): at 03:13

## 2019-02-12 RX ADMIN — Medication 1 TABLET(S): at 21:23

## 2019-02-12 RX ADMIN — Medication 1 TABLET(S): at 17:46

## 2019-02-12 RX ADMIN — Medication 125 MILLIGRAM(S): at 08:43

## 2019-02-12 RX ADMIN — MORPHINE SULFATE 1 MILLIGRAM(S): 50 CAPSULE, EXTENDED RELEASE ORAL at 13:07

## 2019-02-12 RX ADMIN — Medication 125 MILLIGRAM(S): at 21:29

## 2019-02-12 RX ADMIN — Medication 100 MILLIGRAM(S): at 05:23

## 2019-02-12 RX ADMIN — Medication 100 MILLIGRAM(S): at 13:06

## 2019-02-12 RX ADMIN — Medication 1 TABLET(S): at 13:06

## 2019-02-12 RX ADMIN — MORPHINE SULFATE 1 MILLIGRAM(S): 50 CAPSULE, EXTENDED RELEASE ORAL at 18:38

## 2019-02-12 RX ADMIN — MORPHINE SULFATE 1 MILLIGRAM(S): 50 CAPSULE, EXTENDED RELEASE ORAL at 13:37

## 2019-02-12 RX ADMIN — Medication 125 MILLIGRAM(S): at 13:06

## 2019-02-12 RX ADMIN — Medication 1 TABLET(S): at 08:43

## 2019-02-12 RX ADMIN — Medication 650 MILLIGRAM(S): at 10:26

## 2019-02-12 RX ADMIN — Medication 100 MILLIGRAM(S): at 21:23

## 2019-02-12 RX ADMIN — MORPHINE SULFATE 2 MILLIGRAM(S): 50 CAPSULE, EXTENDED RELEASE ORAL at 00:44

## 2019-02-12 RX ADMIN — Medication 650 MILLIGRAM(S): at 11:26

## 2019-02-12 NOTE — PROGRESS NOTE ADULT - SUBJECTIVE AND OBJECTIVE BOX
INTERVAL HPI/OVERNIGHT EVENTS: Patient seen and examined at bedside. No acute events overnight. States her diarrhea has improved.  Admits to recent antibiotic use and recent travel to [facility].     MEDICATIONS  (STANDING):  influenza   Vaccine 0.5 milliLiter(s) IntraMuscular once  lactobacillus acidophilus 1 Tablet(s) Oral four times a day with meals  metroNIDAZOLE  IVPB 500 milliGRAM(s) IV Intermittent every 8 hours  vancomycin    Solution 125 milliGRAM(s) Oral every 6 hours    MEDICATIONS  (PRN):  acetaminophen   Tablet .. 650 milliGRAM(s) Oral every 6 hours PRN Mild Pain (1 - 3)  morphine  - Injectable 2 milliGRAM(s) IV Push every 6 hours PRN Severe Pain (7 - 10)  morphine  - Injectable 1 milliGRAM(s) IV Push every 4 hours PRN Moderate Pain (4 - 6)  ondansetron    Tablet 4 milliGRAM(s) Oral every 6 hours PRN Nausea      Allergies    No Known Allergies    Intolerances        CONSTITUTIONAL: No weakness, fevers or chills  RESPIRATORY: No cough, wheezing, hemoptysis; No shortness of breath  Cvs: No chest pain or palpitations  Gi: No abdominal pain. No nausea, vomiting, +diarrhea AND melena  Neuro: no weakness    All other review of systems is negative unless indicated above.    Vital Signs Last 24 Hrs  T(C): 36.6 (12 Feb 2019 14:00), Max: 37 (11 Feb 2019 22:53)  T(F): 97.9 (12 Feb 2019 14:00), Max: 98.6 (11 Feb 2019 22:53)  HR: 75 (12 Feb 2019 14:00) (65 - 78)  BP: 101/67 (12 Feb 2019 14:00) (99/65 - 110/73)  BP(mean): --  RR: 18 (12 Feb 2019 14:00) (15 - 18)  SpO2: 92% (12 Feb 2019 14:00) (92% - 99%)    General: NAD  Neurology: A&Ox3 , nonfocal  Respiratory: CTA B/L  CV: RRR, S1S2  Abdominal: Soft, NT, ND +BS  Extremities: No edema, + peripheral pulses      LABS:                        13.4   7.75  )-----------( 331      ( 12 Feb 2019 08:25 )             39.6     02-12    140  |  104  |  7   ----------------------------<  99  3.8   |  30  |  0.66    Ca    8.0<L>      12 Feb 2019 08:25    TPro  6.8  /  Alb  2.9<L>  /  TBili  0.7  /  DBili  x   /  AST  17  /  ALT  23  /  AlkPhos  54  02-12    PT/INR - ( 11 Feb 2019 17:01 )   PT: 13.1 sec;   INR: 1.14 ratio         PTT - ( 11 Feb 2019 17:01 )  PTT:29.1 sec      RADIOLOGY & ADDITIONAL TESTS:

## 2019-02-12 NOTE — PROGRESS NOTE ADULT - SUBJECTIVE AND OBJECTIVE BOX
Interval Events: Pt seen and examined. Last loose bm yesterday. No bms today.   abdominal pain is improving, pt denies n/v  requesting regular food     MEDICATIONS  (STANDING):  influenza   Vaccine 0.5 milliLiter(s) IntraMuscular once  lactobacillus acidophilus 1 Tablet(s) Oral four times a day with meals  metroNIDAZOLE  IVPB 500 milliGRAM(s) IV Intermittent every 8 hours  vancomycin    Solution 125 milliGRAM(s) Oral every 6 hours    MEDICATIONS  (PRN):  acetaminophen   Tablet .. 650 milliGRAM(s) Oral every 6 hours PRN Mild Pain (1 - 3)  morphine  - Injectable 2 milliGRAM(s) IV Push every 6 hours PRN Severe Pain (7 - 10)  morphine  - Injectable 1 milliGRAM(s) IV Push every 4 hours PRN Moderate Pain (4 - 6)  ondansetron    Tablet 4 milliGRAM(s) Oral every 6 hours PRN Nausea      Allergies    No Known Allergies    Intolerances        Review of Systems:    General:  No wt loss, fevers, chills, night sweats,fatigue,   Eyes:  Good vision, no reported pain  ENT:  No sore throat, pain, runny nose, dysphagia  CV:  No pain, palpitations, hypo/hypertension  Resp:  No dyspnea, cough, tachypnea, wheezing  GI:  No pain, No nausea, No vomiting, No diarrhea, No constipation, No weight loss, No fever, No pruritis, No rectal bleeding, No melena, No dysphagia  :  No pain, bleeding, incontinence, nocturia  Muscle:  No pain, weakness  Neuro:  No weakness, tingling, memory problems  Psych:  No fatigue, insomnia, mood problems, depression  Endocrine:  No polyuria, polydypsia, cold/heat intolerance  Heme:  No petechiae, ecchymosis, easy bruisability  Skin:  No rash, tattoos, scars, edema      Vital Signs Last 24 Hrs  T(C): 36.9 (12 Feb 2019 04:25), Max: 37.2 (11 Feb 2019 16:12)  T(F): 98.5 (12 Feb 2019 04:25), Max: 99 (11 Feb 2019 16:12)  HR: 75 (12 Feb 2019 04:25) (65 - 87)  BP: 110/73 (12 Feb 2019 04:25) (99/65 - 127/91)  BP(mean): --  RR: 18 (12 Feb 2019 04:25) (15 - 18)  SpO2: 98% (12 Feb 2019 04:25) (93% - 99%)    PHYSICAL EXAM:    Constitutional: NAD, well-developed  HEENT: EOMI, throat clear  Neck: No LAD, supple  Respiratory: CTA and P  Cardiovascular: S1 and S2, RRR, no M  Gastrointestinal: BS+, soft, NT/ND, neg HSM,  Extremities: No peripheral edema, neg clubing, cyanosis  Vascular: 2+ peripheral pulses  Neurological: A/O x 3, no focal deficits  Psychiatric: Normal mood, normal affect  Skin: No rashes      LABS:                        13.4   7.75  )-----------( 331      ( 12 Feb 2019 08:25 )             39.6     02-12    140  |  104  |  7   ----------------------------<  99  3.8   |  30  |  0.66    Ca    8.0<L>      12 Feb 2019 08:25    TPro  6.8  /  Alb  2.9<L>  /  TBili  0.7  /  DBili  x   /  AST  17  /  ALT  23  /  AlkPhos  54  02-12    PT/INR - ( 11 Feb 2019 17:01 )   PT: 13.1 sec;   INR: 1.14 ratio         PTT - ( 11 Feb 2019 17:01 )  PTT:29.1 sec      RADIOLOGY & ADDITIONAL TESTS:

## 2019-02-12 NOTE — PROGRESS NOTE ADULT - ASSESSMENT
48 year old F with PSH of hemorrhoidectomy anal fistulotomy who presented with c/o watery, dark diarrhea x2 days, admits to recent abx use for URI symptoms, admitted for colitis.

## 2019-02-13 ENCOUNTER — TRANSCRIPTION ENCOUNTER (OUTPATIENT)
Age: 49
End: 2019-02-13

## 2019-02-13 VITALS
DIASTOLIC BLOOD PRESSURE: 63 MMHG | OXYGEN SATURATION: 97 % | SYSTOLIC BLOOD PRESSURE: 108 MMHG | RESPIRATION RATE: 18 BRPM | HEART RATE: 86 BPM | TEMPERATURE: 99 F

## 2019-02-13 DIAGNOSIS — R10.9 UNSPECIFIED ABDOMINAL PAIN: ICD-10-CM

## 2019-02-13 LAB
ALBUMIN SERPL ELPH-MCNC: 2.9 G/DL — LOW (ref 3.3–5)
ALP SERPL-CCNC: 54 U/L — SIGNIFICANT CHANGE UP (ref 40–120)
ALT FLD-CCNC: 21 U/L — SIGNIFICANT CHANGE UP (ref 12–78)
ANION GAP SERPL CALC-SCNC: 5 MMOL/L — SIGNIFICANT CHANGE UP (ref 5–17)
AST SERPL-CCNC: 14 U/L — LOW (ref 15–37)
AUTO DIFF PNL BLD: NEGATIVE — SIGNIFICANT CHANGE UP
BAKER'S YEAST IGA QN IA: 9.9 UNITS — SIGNIFICANT CHANGE UP
BAKER'S YEAST IGA QN IA: NEGATIVE — SIGNIFICANT CHANGE UP
BAKER'S YEAST IGG QN IA: 11.6 UNITS — SIGNIFICANT CHANGE UP
BAKER'S YEAST IGG QN IA: NEGATIVE — SIGNIFICANT CHANGE UP
BILIRUB SERPL-MCNC: 0.6 MG/DL — SIGNIFICANT CHANGE UP (ref 0.2–1.2)
BUN SERPL-MCNC: 7 MG/DL — SIGNIFICANT CHANGE UP (ref 7–23)
C DIFF BY PCR RESULT: SIGNIFICANT CHANGE UP
C DIFF TOX GENS STL QL NAA+PROBE: SIGNIFICANT CHANGE UP
C-ANCA SER-ACNC: NEGATIVE — SIGNIFICANT CHANGE UP
CALCIUM SERPL-MCNC: 8.7 MG/DL — SIGNIFICANT CHANGE UP (ref 8.5–10.1)
CHLORIDE SERPL-SCNC: 105 MMOL/L — SIGNIFICANT CHANGE UP (ref 96–108)
CO2 SERPL-SCNC: 31 MMOL/L — SIGNIFICANT CHANGE UP (ref 22–31)
CREAT SERPL-MCNC: 0.72 MG/DL — SIGNIFICANT CHANGE UP (ref 0.5–1.3)
GLUCOSE SERPL-MCNC: 92 MG/DL — SIGNIFICANT CHANGE UP (ref 70–99)
HCT VFR BLD CALC: 40.2 % — SIGNIFICANT CHANGE UP (ref 34.5–45)
HGB BLD-MCNC: 13.5 G/DL — SIGNIFICANT CHANGE UP (ref 11.5–15.5)
MCHC RBC-ENTMCNC: 30.8 PG — SIGNIFICANT CHANGE UP (ref 27–34)
MCHC RBC-ENTMCNC: 33.6 GM/DL — SIGNIFICANT CHANGE UP (ref 32–36)
MCV RBC AUTO: 91.8 FL — SIGNIFICANT CHANGE UP (ref 80–100)
NRBC # BLD: 0 /100 WBCS — SIGNIFICANT CHANGE UP (ref 0–0)
OB PNL STL: POSITIVE
P-ANCA SER-ACNC: NEGATIVE — SIGNIFICANT CHANGE UP
PLATELET # BLD AUTO: 319 K/UL — SIGNIFICANT CHANGE UP (ref 150–400)
POTASSIUM SERPL-MCNC: 3.8 MMOL/L — SIGNIFICANT CHANGE UP (ref 3.5–5.3)
POTASSIUM SERPL-SCNC: 3.8 MMOL/L — SIGNIFICANT CHANGE UP (ref 3.5–5.3)
PROT SERPL-MCNC: 6.7 G/DL — SIGNIFICANT CHANGE UP (ref 6–8.3)
RBC # BLD: 4.38 M/UL — SIGNIFICANT CHANGE UP (ref 3.8–5.2)
RBC # FLD: 12.4 % — SIGNIFICANT CHANGE UP (ref 10.3–14.5)
SODIUM SERPL-SCNC: 141 MMOL/L — SIGNIFICANT CHANGE UP (ref 135–145)
WBC # BLD: 8.02 K/UL — SIGNIFICANT CHANGE UP (ref 3.8–10.5)
WBC # FLD AUTO: 8.02 K/UL — SIGNIFICANT CHANGE UP (ref 3.8–10.5)

## 2019-02-13 PROCEDURE — 96376 TX/PRO/DX INJ SAME DRUG ADON: CPT

## 2019-02-13 PROCEDURE — 85730 THROMBOPLASTIN TIME PARTIAL: CPT

## 2019-02-13 PROCEDURE — 99285 EMERGENCY DEPT VISIT HI MDM: CPT | Mod: 25

## 2019-02-13 PROCEDURE — 86900 BLOOD TYPING SEROLOGIC ABO: CPT

## 2019-02-13 PROCEDURE — 87493 C DIFF AMPLIFIED PROBE: CPT

## 2019-02-13 PROCEDURE — 82272 OCCULT BLD FECES 1-3 TESTS: CPT

## 2019-02-13 PROCEDURE — 99239 HOSP IP/OBS DSCHRG MGMT >30: CPT

## 2019-02-13 PROCEDURE — 83520 IMMUNOASSAY QUANT NOS NONAB: CPT

## 2019-02-13 PROCEDURE — 84145 PROCALCITONIN (PCT): CPT

## 2019-02-13 PROCEDURE — 86901 BLOOD TYPING SEROLOGIC RH(D): CPT

## 2019-02-13 PROCEDURE — 96375 TX/PRO/DX INJ NEW DRUG ADDON: CPT

## 2019-02-13 PROCEDURE — 86850 RBC ANTIBODY SCREEN: CPT

## 2019-02-13 PROCEDURE — 87389 HIV-1 AG W/HIV-1&-2 AB AG IA: CPT

## 2019-02-13 PROCEDURE — 80053 COMPREHEN METABOLIC PANEL: CPT

## 2019-02-13 PROCEDURE — 74177 CT ABD & PELVIS W/CONTRAST: CPT

## 2019-02-13 PROCEDURE — 36415 COLL VENOUS BLD VENIPUNCTURE: CPT

## 2019-02-13 PROCEDURE — 87177 OVA AND PARASITES SMEARS: CPT

## 2019-02-13 PROCEDURE — 85652 RBC SED RATE AUTOMATED: CPT

## 2019-02-13 PROCEDURE — 93005 ELECTROCARDIOGRAM TRACING: CPT

## 2019-02-13 PROCEDURE — 96374 THER/PROPH/DIAG INJ IV PUSH: CPT

## 2019-02-13 PROCEDURE — 85610 PROTHROMBIN TIME: CPT

## 2019-02-13 PROCEDURE — 86036 ANCA SCREEN EACH ANTIBODY: CPT

## 2019-02-13 PROCEDURE — 85027 COMPLETE CBC AUTOMATED: CPT

## 2019-02-13 PROCEDURE — 84702 CHORIONIC GONADOTROPIN TEST: CPT

## 2019-02-13 RX ORDER — METRONIDAZOLE 500 MG
1 TABLET ORAL
Qty: 21 | Refills: 0
Start: 2019-02-13 | End: 2019-02-19

## 2019-02-13 RX ORDER — LACTOBACILLUS ACIDOPHILUS 100MM CELL
1 CAPSULE ORAL
Qty: 42 | Refills: 0
Start: 2019-02-13 | End: 2019-02-26

## 2019-02-13 RX ADMIN — Medication 1 TABLET(S): at 08:20

## 2019-02-13 RX ADMIN — Medication 100 MILLIGRAM(S): at 13:48

## 2019-02-13 RX ADMIN — Medication 125 MILLIGRAM(S): at 13:48

## 2019-02-13 RX ADMIN — Medication 125 MILLIGRAM(S): at 08:20

## 2019-02-13 RX ADMIN — Medication 1 TABLET(S): at 12:22

## 2019-02-13 RX ADMIN — Medication 100 MILLIGRAM(S): at 06:20

## 2019-02-13 NOTE — DISCHARGE NOTE ADULT - MEDICATION SUMMARY - MEDICATIONS TO TAKE
I will START or STAY ON the medications listed below when I get home from the hospital:    Flagyl 500 mg oral tablet  -- 1 tab(s) by mouth 3 times a day   -- Do not drink alcoholic beverages when taking this medication.  Finish all this medication unless otherwise directed by prescriber.  May discolor urine or feces.    -- Indication: For Colitis, acute    lactobacillus acidophilus oral capsule  -- 1 tab(s) by mouth 3 times a day   -- Indication: For Probiotics

## 2019-02-13 NOTE — DISCHARGE NOTE ADULT - CARE PLAN
Principal Discharge DX:	Colitis, acute  Goal:	stable  Assessment and plan of treatment:	Continue metronidazole antibiotics for 7 days  Follow up with GI within 2-3 weeks  Secondary Diagnosis:	Hemorrhoids  Assessment and plan of treatment:	Follow up with your surgeon as scheduled

## 2019-02-13 NOTE — PROGRESS NOTE ADULT - PROBLEM SELECTOR PLAN 1
- gi pcr and stool studies pending  - continue PO vanco and IV flagyl for possible c diff given recent abx use  - diet advanced to fulls, advance as tolerated  - check asca/anca, esr/crp to r/o inflammatory bowel disease
CT w mid to distal descending colitis  afebrile, leukocytosis resolved, abd pain improved  gi pcr/cdiff pending- no further bms per pt  f/u asca anca  currently on fulls, would continue to advance diet as tolerated (LRLF)  cont abx per primary team  cont bacid  monitor abd exam  prn pain control  outpatient gi f/u upon dc will follow
-Likely infectious, recent ABX, recent travel to [facility], also recent ingestion of seafood at "[employer]"  -CT scan showed: Thickening of the mid to distal descending colon with pericolonic infiltrative changes, compatible with colitis  -Awaiting c-difficile  -Continue PO Vancomycin   -Pain control: mild: Tylenol, moderate: morphine 1 severe: morphine 2 mg   -GI Dr. Watts following  - diet advanced to fulls, advance as tolerated  - F/u asca/anca, esr/crp to r/o inflammatory bowel disease.

## 2019-02-13 NOTE — DISCHARGE NOTE ADULT - CARE PROVIDER_API CALL
Dread Watts ()  Gastroenterology; Internal Medicine  [address] [place] [address]  Phone: [phone]  Fax: [phone]  Follow Up Time:     Pedro Spivey)  Internal Medicine  [address] [place] [address]  Phone: [phone]  Fax: [phone]  Follow Up Time:

## 2019-02-13 NOTE — DISCHARGE NOTE ADULT - PATIENT PORTAL LINK FT
You can access the [facility] Patient Portal, offered by [facility], by registering with the following website: [email or web address]

## 2019-02-13 NOTE — DISCHARGE NOTE ADULT - PLAN OF CARE
stable Continue metronidazole antibiotics for 7 days  Follow up with GI within 2-3 weeks Follow up with your surgeon as scheduled

## 2019-02-13 NOTE — PROGRESS NOTE ADULT - SUBJECTIVE AND OBJECTIVE BOX
INTERVAL HPI/OVERNIGHT EVENTS:  pt seen and examined  states abd pain better  denies n/v  no bms since admission, feels somewhat constipated  afebrile overnight labs noted    MEDICATIONS  (STANDING):  influenza   Vaccine 0.5 milliLiter(s) IntraMuscular once  lactobacillus acidophilus 1 Tablet(s) Oral four times a day with meals  metroNIDAZOLE  IVPB 500 milliGRAM(s) IV Intermittent every 8 hours  vancomycin    Solution 125 milliGRAM(s) Oral every 6 hours    MEDICATIONS  (PRN):  acetaminophen   Tablet .. 650 milliGRAM(s) Oral every 6 hours PRN Mild Pain (1 - 3)  morphine  - Injectable 2 milliGRAM(s) IV Push every 6 hours PRN Severe Pain (7 - 10)  morphine  - Injectable 1 milliGRAM(s) IV Push every 4 hours PRN Moderate Pain (4 - 6)  ondansetron    Tablet 4 milliGRAM(s) Oral every 6 hours PRN Nausea      Allergies    No Known Allergies    Intolerances        Review of Systems:    General:  No wt loss, fevers, chills, night sweats, fatigue   Eyes:  Good vision, no reported pain  ENT:  No sore throat, pain, runny nose, dysphagia  CV:  No pain, palpitations, hypo/hypertension  Resp:  No dyspnea, cough, tachypnea, wheezing  GI:  No pain, No nausea, No vomiting, No diarrhea, +constipation, No weight loss, No fever, No pruritis, No rectal bleeding, No melena, No dysphagia  :  No pain, bleeding, incontinence, nocturia  Muscle:  No pain, weakness  Neuro:  No weakness, tingling, memory problems  Psych:  No fatigue, insomnia, mood problems, depression  Endocrine:  No polyuria, polydypsia, cold/heat intolerance  Heme:  No petechiae, ecchymosis, easy bruisability  Skin:  No rash, tattoos, scars, edema      Vital Signs Last 24 Hrs  T(C): 36.7 (13 Feb 2019 05:21), Max: 37.3 (12 Feb 2019 21:49)  T(F): 98.1 (13 Feb 2019 05:21), Max: 99.1 (12 Feb 2019 21:49)  HR: 69 (13 Feb 2019 05:21) (69 - 77)  BP: 123/85 (13 Feb 2019 05:21) (101/67 - 123/85)  BP(mean): --  RR: 19 (13 Feb 2019 05:21) (18 - 19)  SpO2: 97% (13 Feb 2019 05:21) (92% - 97%)    PHYSICAL EXAM:    Constitutional: NAD  HEENT: ncat  Neck: No LAD  Respiratory: dec bs  Cardiovascular: S1 and S2, RRR  Gastrointestinal: soft mild ttp b/l lq mild dt  Extremities: No peripheral edema  Vascular: 2+ peripheral pulses  Neurological: Awake alert responds appropriately  Skin: No rashes      LABS:                        13.5   8.02  )-----------( 319      ( 13 Feb 2019 07:44 )             40.2     02-13    141  |  105  |  7   ----------------------------<  92  3.8   |  31  |  0.72    Ca    8.7      13 Feb 2019 07:44    TPro  6.7  /  Alb  2.9<L>  /  TBili  0.6  /  DBili  x   /  AST  14<L>  /  ALT  21  /  AlkPhos  54  02-13    PT/INR - ( 11 Feb 2019 17:01 )   PT: 13.1 sec;   INR: 1.14 ratio         PTT - ( 11 Feb 2019 17:01 )  PTT:29.1 sec      RADIOLOGY & ADDITIONAL TESTS:

## 2019-02-13 NOTE — PROGRESS NOTE ADULT - PROBLEM SELECTOR PLAN 2
see above, improved
BB IMPROVE VTE Individual Risk Assessment          RISK                                                          Points    [  0] Previous VTE                                                3  [0  ] Thrombophilia                                             2  [ 0 ] Lower limb paralysis                                   2        (unable to hold up >15 seconds)    [0  ] Current Cancer                                            2         (within 6 months)  [ 0 ] Immobilization > 24 hrs                              1  [  0] ICU/CCU stay > 24 hours                            1  [0  ] Age > 60                                                    1    IMPROVE VTE Score _____0____  SCDs for dvt Ppx

## 2019-02-13 NOTE — DISCHARGE NOTE ADULT - HOSPITAL COURSE
48 year old F with PSH of hemorrhoidectomy anal fistulotomy, who presented with complaint of abdominal pain and dark red diarrhea. Patient states that she had lower abdominal pain for 1 day, and over the past 12 hrs she has had 4-5 episodes of water diarrhea, dark red in color. Patient also has nausea, and one episode of NBNB vomiting.  Of note patient returned from [facility], one week ago. While at [facility], patient states she had one day of subjective fever, swollen ankles, followed by URI which she was treated for with antibiotics (patient does not recall name, completed 9/10 days) and prednisone for 5 days. Denies fever/chills.    In the ED, the patient's vital signs were T: 99, HR 87 bpm, /91, R: 15, SpO2 98% on RA. WBC 11.48, H/H 14.6/42.7, Plt 361. PT/INR/PTT 13.1/1.14/29.1. CMP WNL. HCG serum 2. EKG pending.       CT A/P: Thickening of the mid to distal descending colon with pericolonic infiltrative changes, compatible with colitis, differential includes infectious versus inflammatory versus ischemic etiology given segment of involved colon. Normal appendix. No bowel obstruction. 4.7 cm right ovarian cyst.   She received Flagyl 500mg IV x1, 1L NS bolus x1, 4mg morphine x2, Zofran x1. Dr. Watts evaluated the patient in the ED and started patient on PO Vancomycin, CLD diet and bacid. FOBT ordered in ED, pending collection.     Patient was admitted for GI bleed. Had CT scan done showing colitis. She was started on PO vancomycin and Flagyl pending to rule out diarrhea. Patient did not have further episodes of diarrhea and after discussing with GI Dr Watts unlikely patient has C-difficile, PO vancomycin stopped. Patient aware to continue metronidazole and follow up with GI closely. Patient remains hemodynamically stable to discharge to home with close outpatient follow up.     Vital Signs Last 24 Hrs  T(C): 37.1 (13 Feb 2019 13:53), Max: 37.3 (12 Feb 2019 21:49)  T(F): 98.7 (13 Feb 2019 13:53), Max: 99.1 (12 Feb 2019 21:49)  HR: 86 (13 Feb 2019 13:53) (69 - 86)  BP: 108/63 (13 Feb 2019 13:53) (104/68 - 123/85)  BP(mean): --  RR: 18 (13 Feb 2019 13:53) (18 - 19)  SpO2: 97% (13 Feb 2019 13:53) (97% - 97%)    General: WN/WD NAD  Neurology: A&Ox3, nonfocal, BARAJAS x 4  Respiratory: CTA B/L  CV: RRR, S1S2, no murmurs, rubs or gallops  Abdominal: Soft, NT, ND +BS  Extremities: No edema, + peripheral pulses

## 2019-02-14 LAB
CULTURE RESULTS: SIGNIFICANT CHANGE UP
SPECIMEN SOURCE: SIGNIFICANT CHANGE UP

## 2019-02-23 ENCOUNTER — APPOINTMENT (OUTPATIENT)
Dept: INTERNAL MEDICINE | Facility: CLINIC | Age: 49
End: 2019-02-23

## 2019-03-04 ENCOUNTER — APPOINTMENT (OUTPATIENT)
Dept: OBGYN | Facility: CLINIC | Age: 49
End: 2019-03-04
Payer: COMMERCIAL

## 2019-03-04 VITALS
DIASTOLIC BLOOD PRESSURE: 82 MMHG | BODY MASS INDEX: 33.99 KG/M2 | SYSTOLIC BLOOD PRESSURE: 123 MMHG | HEIGHT: 61 IN | WEIGHT: 180 LBS

## 2019-03-04 DIAGNOSIS — Z87.898 PERSONAL HISTORY OF OTHER SPECIFIED CONDITIONS: ICD-10-CM

## 2019-03-04 DIAGNOSIS — Z87.59 PERSONAL HISTORY OF OTHER COMPLICATIONS OF PREGNANCY, CHILDBIRTH AND THE PUERPERIUM: ICD-10-CM

## 2019-03-04 PROCEDURE — 99386 PREV VISIT NEW AGE 40-64: CPT

## 2019-03-04 PROCEDURE — 36415 COLL VENOUS BLD VENIPUNCTURE: CPT

## 2019-03-04 RX ORDER — HYDROCODONE BITARTRATE AND HOMATROPINE METHYLBROMIDE 5; 1.5 MG/5ML; MG/5ML
5-1.5 SYRUP ORAL
Qty: 240 | Refills: 0 | Status: COMPLETED | COMMUNITY
Start: 2018-01-29 | End: 2019-03-04

## 2019-03-04 RX ORDER — VARENICLINE TARTRATE 0.5 (11)-1
0.5 MG X 11 & KIT ORAL
Qty: 1 | Refills: 0 | Status: COMPLETED | COMMUNITY
Start: 2018-01-29 | End: 2019-03-04

## 2019-03-04 RX ORDER — PREDNISONE 20 MG/1
20 TABLET ORAL
Qty: 5 | Refills: 0 | Status: COMPLETED | COMMUNITY
Start: 2018-01-29 | End: 2019-03-04

## 2019-03-04 NOTE — HISTORY OF PRESENT ILLNESS
[1 Year Ago] : 1 year ago [Good] : being in good health [Last Mammogram ___] : Last Mammogram was [unfilled] [Last Pap ___] : Last cervical pap smear was [unfilled]

## 2019-03-04 NOTE — CHIEF COMPLAINT
[Initial Visit] : initial GYN visit [FreeTextEntry1] : Annual exam \par Pt states menses started spacing out about 6 months ago, very irregular. Sometimes will be heavy, sometimes very light. Sometimes will come twice in a month and then will skip.\par Pt interested in birth control - but not the pill

## 2019-03-07 LAB
C TRACH RRNA SPEC QL NAA+PROBE: NOT DETECTED
CYTOLOGY CVX/VAG DOC THIN PREP: NORMAL
FSH SERPL-MCNC: 28.8 IU/L
HBV SURFACE AG SER QL: NONREACTIVE
HCV AB SER QL: NONREACTIVE
HCV S/CO RATIO: 0.2 S/CO
HIV1+2 AB SPEC QL IA.RAPID: NONREACTIVE
HPV HIGH+LOW RISK DNA PNL CVX: DETECTED
N GONORRHOEA RRNA SPEC QL NAA+PROBE: NOT DETECTED
SOURCE AMPLIFICATION: NORMAL
SOURCE AMPLIFICATION: NORMAL
T PALLIDUM AB SER QL IA: NEGATIVE
T VAGINALIS RRNA SPEC QL NAA+PROBE: NOT DETECTED
T3FREE SERPL-MCNC: 3.18 PG/ML
T4 FREE SERPL-MCNC: 1.3 NG/DL
TSH SERPL-ACNC: 0.84 UIU/ML

## 2019-03-14 ENCOUNTER — APPOINTMENT (OUTPATIENT)
Dept: OBGYN | Facility: CLINIC | Age: 49
End: 2019-03-14

## 2019-04-09 ENCOUNTER — APPOINTMENT (OUTPATIENT)
Dept: OBGYN | Facility: CLINIC | Age: 49
End: 2019-04-09
Payer: COMMERCIAL

## 2019-04-09 ENCOUNTER — ASOB RESULT (OUTPATIENT)
Age: 49
End: 2019-04-09

## 2019-04-09 PROCEDURE — 76830 TRANSVAGINAL US NON-OB: CPT

## 2019-04-19 ENCOUNTER — RESULT REVIEW (OUTPATIENT)
Age: 49
End: 2019-04-19

## 2019-05-13 ENCOUNTER — APPOINTMENT (OUTPATIENT)
Dept: OBGYN | Facility: CLINIC | Age: 49
End: 2019-05-13
Payer: COMMERCIAL

## 2019-05-13 DIAGNOSIS — D25.1 INTRAMURAL LEIOMYOMA OF UTERUS: ICD-10-CM

## 2019-05-13 DIAGNOSIS — D25.0 INTRAMURAL LEIOMYOMA OF UTERUS: ICD-10-CM

## 2019-05-13 PROCEDURE — 99214 OFFICE O/P EST MOD 30 MIN: CPT

## 2019-05-13 NOTE — CHIEF COMPLAINT
[FreeTextEntry1] : Pt presents today for insertion of IUD for irregular bleeding - however, upon reviewing ultrasound, which shows 4.5cm right ovarian cyst, there is also a small intramural/submucous myoma.  Pt also states she has bleeding with intercourse

## 2019-06-03 ENCOUNTER — TRANSCRIPTION ENCOUNTER (OUTPATIENT)
Age: 49
End: 2019-06-03

## 2019-06-03 ENCOUNTER — OUTPATIENT (OUTPATIENT)
Dept: OUTPATIENT SERVICES | Facility: HOSPITAL | Age: 49
LOS: 1 days | End: 2019-06-03

## 2019-06-03 VITALS
WEIGHT: 182.1 LBS | SYSTOLIC BLOOD PRESSURE: 110 MMHG | OXYGEN SATURATION: 99 % | DIASTOLIC BLOOD PRESSURE: 80 MMHG | HEART RATE: 60 BPM | TEMPERATURE: 98 F | HEIGHT: 61.5 IN | RESPIRATION RATE: 16 BRPM

## 2019-06-03 DIAGNOSIS — Z91.040 LATEX ALLERGY STATUS: ICD-10-CM

## 2019-06-03 DIAGNOSIS — D25.9 LEIOMYOMA OF UTERUS, UNSPECIFIED: ICD-10-CM

## 2019-06-03 DIAGNOSIS — K60.3 ANAL FISTULA: Chronic | ICD-10-CM

## 2019-06-03 LAB
HCT VFR BLD CALC: 43.8 % — SIGNIFICANT CHANGE UP (ref 34.5–45)
HGB BLD-MCNC: 14.7 G/DL — SIGNIFICANT CHANGE UP (ref 11.5–15.5)
MCHC RBC-ENTMCNC: 31.4 PG — SIGNIFICANT CHANGE UP (ref 27–34)
MCHC RBC-ENTMCNC: 33.6 % — SIGNIFICANT CHANGE UP (ref 32–36)
MCV RBC AUTO: 93.6 FL — SIGNIFICANT CHANGE UP (ref 80–100)
NRBC # FLD: 0 K/UL — SIGNIFICANT CHANGE UP (ref 0–0)
PLATELET # BLD AUTO: 363 K/UL — SIGNIFICANT CHANGE UP (ref 150–400)
PMV BLD: 9.1 FL — SIGNIFICANT CHANGE UP (ref 7–13)
RBC # BLD: 4.68 M/UL — SIGNIFICANT CHANGE UP (ref 3.8–5.2)
RBC # FLD: 12.4 % — SIGNIFICANT CHANGE UP (ref 10.3–14.5)
WBC # BLD: 6.5 K/UL — SIGNIFICANT CHANGE UP (ref 3.8–10.5)
WBC # FLD AUTO: 6.5 K/UL — SIGNIFICANT CHANGE UP (ref 3.8–10.5)

## 2019-06-03 RX ORDER — SODIUM CHLORIDE 9 MG/ML
1000 INJECTION, SOLUTION INTRAVENOUS
Refills: 0 | Status: DISCONTINUED | OUTPATIENT
Start: 2019-06-04 | End: 2019-06-19

## 2019-06-03 RX ORDER — SODIUM CHLORIDE 9 MG/ML
3 INJECTION INTRAMUSCULAR; INTRAVENOUS; SUBCUTANEOUS ONCE
Refills: 0 | Status: DISCONTINUED | OUTPATIENT
Start: 2019-06-04 | End: 2019-06-19

## 2019-06-03 NOTE — H&P PST ADULT - GASTROINTESTINAL COMMENTS
Pt was hospitalized in February after rob a stomach bug. Pt was hospitalized in February after rob a stomach bug- symptoms resolved

## 2019-06-03 NOTE — H&P PST ADULT - HISTORY OF PRESENT ILLNESS
49 y/o female presents to PST for preoperative evaluation with dx of leiomyoma of uterus. Pt states a recent vaginal sonogram revealed uterine fibroids. Scheduled for Dilation Curettage Hysteroscopy with Symphion, Insertion of Kyleena Intra Uterine Device on 6/4/2019.

## 2019-06-03 NOTE — H&P PST ADULT - ATTENDING COMMENTS
Kyleena is not available at \[facility]\"".  Discussed with patient who agrees with insertion of mirena IUD instead

## 2019-06-03 NOTE — H&P PST ADULT - NSICDXPROBLEM_GEN_ALL_CORE_FT
PROBLEM DIAGNOSES  Problem: Leiomyoma of uterus  Assessment and Plan: Scheduled for Dilation Curettage Hysteroscopy with Symphion, Insertion of Kyleena Intra Uterine Device on 6/4/2019.  Preop instructions given, pt verbalized understanding   GI prophylaxis provided    Problem: Latex allergy  Assessment and Plan: OR booking notified via fax

## 2019-06-03 NOTE — H&P PST ADULT - NSICDXFAMILYHX_GEN_ALL_CORE_FT
FAMILY HISTORY:  Mother  Still living? Unknown  Diabetes mellitus, Age at diagnosis: Age Unknown  History of hypertension, Age at diagnosis: Age Unknown

## 2019-06-03 NOTE — H&P PST ADULT - NEGATIVE GENERAL SYMPTOMS
no weight gain/no polyuria/no polydipsia/no sweating/no anorexia/no weight loss/no polyphagia/no malaise/no fever/no chills

## 2019-06-03 NOTE — H&P PST ADULT - PRO TOBACCO TYPE
quit 2018. Pt smoked a few cigarettes 2 days ago/cigarettes quit in early 2018. Pt did smoke a few cigarettes 2 days ago/cigarettes

## 2019-06-04 ENCOUNTER — OUTPATIENT (OUTPATIENT)
Dept: OUTPATIENT SERVICES | Facility: HOSPITAL | Age: 49
LOS: 1 days | Discharge: HOME | End: 2019-06-04
Payer: COMMERCIAL

## 2019-06-04 ENCOUNTER — RESULT REVIEW (OUTPATIENT)
Age: 49
End: 2019-06-04

## 2019-06-04 ENCOUNTER — APPOINTMENT (OUTPATIENT)
Dept: OBGYN | Facility: HOSPITAL | Age: 49
End: 2019-06-04

## 2019-06-04 VITALS
OXYGEN SATURATION: 97 % | SYSTOLIC BLOOD PRESSURE: 113 MMHG | RESPIRATION RATE: 16 BRPM | DIASTOLIC BLOOD PRESSURE: 69 MMHG | HEART RATE: 70 BPM

## 2019-06-04 VITALS
RESPIRATION RATE: 16 BRPM | HEIGHT: 61.5 IN | HEART RATE: 79 BPM | OXYGEN SATURATION: 97 % | DIASTOLIC BLOOD PRESSURE: 71 MMHG | SYSTOLIC BLOOD PRESSURE: 107 MMHG | TEMPERATURE: 98 F | WEIGHT: 182.1 LBS

## 2019-06-04 DIAGNOSIS — D25.9 LEIOMYOMA OF UTERUS, UNSPECIFIED: ICD-10-CM

## 2019-06-04 DIAGNOSIS — K60.3 ANAL FISTULA: Chronic | ICD-10-CM

## 2019-06-04 LAB — HCG UR QL: NEGATIVE — SIGNIFICANT CHANGE UP

## 2019-06-04 PROCEDURE — 58300 INSERT INTRAUTERINE DEVICE: CPT

## 2019-06-04 PROCEDURE — 88305 TISSUE EXAM BY PATHOLOGIST: CPT | Mod: 26

## 2019-06-04 PROCEDURE — 58562 HYSTEROSCOPY REMOVE FB: CPT

## 2019-06-04 RX ORDER — HYDROMORPHONE HYDROCHLORIDE 2 MG/ML
0.5 INJECTION INTRAMUSCULAR; INTRAVENOUS; SUBCUTANEOUS
Refills: 0 | Status: DISCONTINUED | OUTPATIENT
Start: 2019-06-04 | End: 2019-06-04

## 2019-06-04 RX ORDER — SODIUM CHLORIDE 9 MG/ML
1000 INJECTION, SOLUTION INTRAVENOUS
Refills: 0 | Status: DISCONTINUED | OUTPATIENT
Start: 2019-06-04 | End: 2019-06-19

## 2019-06-04 RX ORDER — ONDANSETRON 8 MG/1
4 TABLET, FILM COATED ORAL ONCE
Refills: 0 | Status: DISCONTINUED | OUTPATIENT
Start: 2019-06-04 | End: 2019-06-19

## 2019-06-04 RX ADMIN — HYDROMORPHONE HYDROCHLORIDE 0.5 MILLIGRAM(S): 2 INJECTION INTRAMUSCULAR; INTRAVENOUS; SUBCUTANEOUS at 11:10

## 2019-06-04 RX ADMIN — HYDROMORPHONE HYDROCHLORIDE 0.5 MILLIGRAM(S): 2 INJECTION INTRAMUSCULAR; INTRAVENOUS; SUBCUTANEOUS at 11:21

## 2019-06-04 RX ADMIN — HYDROMORPHONE HYDROCHLORIDE 0.5 MILLIGRAM(S): 2 INJECTION INTRAMUSCULAR; INTRAVENOUS; SUBCUTANEOUS at 11:20

## 2019-06-04 NOTE — ASU DISCHARGE PLAN (ADULT/PEDIATRIC) - NURSING INSTRUCTIONS
Next dose of Tylenol will be on or after ____16.15_______ ,today/tonight, If needed for pain/cramps. Your first dose of Tylenol was given at ___10.15________. Do not exceed more than 4000mg of Tylenol in one 24 hour setting. Next dose of NSAIDS (Motrin/Alleve) will be on or after _____16.15______ today if needed for pain/cramps.

## 2019-06-04 NOTE — BRIEF OPERATIVE NOTE - OPERATION/FINDINGS
small anteverted uterus. cavity intact and ostia visualized, wnl. 2-3cm submucosal anterior fibroid, resected with symphion without complications

## 2019-06-04 NOTE — ASU DISCHARGE PLAN (ADULT/PEDIATRIC) - CARE PROVIDER_API CALL
Rina Chambers)  [place] [address]  Phone: [phone]  Fax: [phone]  Follow Up Time:

## 2019-06-04 NOTE — ASU DISCHARGE PLAN (ADULT/PEDIATRIC) - CALL YOUR DOCTOR IF YOU HAVE ANY OF THE FOLLOWING:
Bleeding that does not stop/Nausea and vomiting that does not stop/Unable to urinate/Wound/Surgical Site with redness, or foul smelling discharge or pus

## 2019-06-07 PROBLEM — D25.9 LEIOMYOMA OF UTERUS, UNSPECIFIED: Chronic | Status: ACTIVE | Noted: 2019-06-03

## 2019-06-21 ENCOUNTER — APPOINTMENT (OUTPATIENT)
Dept: OBGYN | Facility: CLINIC | Age: 49
End: 2019-06-21
Payer: COMMERCIAL

## 2019-06-21 VITALS
SYSTOLIC BLOOD PRESSURE: 111 MMHG | HEIGHT: 61 IN | DIASTOLIC BLOOD PRESSURE: 80 MMHG | BODY MASS INDEX: 33.99 KG/M2 | WEIGHT: 180 LBS

## 2019-06-21 DIAGNOSIS — Z09 ENCOUNTER FOR FOLLOW-UP EXAMINATION AFTER COMPLETED TREATMENT FOR CONDITIONS OTHER THAN MALIGNANT NEOPLASM: ICD-10-CM

## 2019-06-21 DIAGNOSIS — Z97.5 PRESENCE OF (INTRAUTERINE) CONTRACEPTIVE DEVICE: ICD-10-CM

## 2019-06-21 PROCEDURE — 99213 OFFICE O/P EST LOW 20 MIN: CPT

## 2019-06-21 NOTE — PHYSICAL EXAM
[Normal] : uterus [No Bleeding] : there was no active vaginal bleeding [IUD String] : had an IUD string protruding out [Uterine Adnexae] : were not tender and not enlarged [de-identified] : no CVA tenderness

## 2019-06-21 NOTE — CHIEF COMPLAINT
[FreeTextEntry1] : S/p D&C operative hysteroscopy, IUD insertion\par Pt c/o right back pain - thinks she has kidney infection or kidney stone - hurts when she moves, denies dysuria, deneis fever or chills. Feels like muscle spasm. [Post-Op Visit] : post-operative visit

## 2019-06-24 ENCOUNTER — APPOINTMENT (OUTPATIENT)
Dept: OBGYN | Facility: CLINIC | Age: 49
End: 2019-06-24
Payer: COMMERCIAL

## 2019-06-24 ENCOUNTER — ASOB RESULT (OUTPATIENT)
Age: 49
End: 2019-06-24

## 2019-06-24 PROCEDURE — 76830 TRANSVAGINAL US NON-OB: CPT

## 2019-08-19 ENCOUNTER — ASOB RESULT (OUTPATIENT)
Age: 49
End: 2019-08-19

## 2019-08-19 ENCOUNTER — APPOINTMENT (OUTPATIENT)
Dept: OBGYN | Facility: CLINIC | Age: 49
End: 2019-08-19
Payer: COMMERCIAL

## 2019-08-19 VITALS
BODY MASS INDEX: 34.74 KG/M2 | DIASTOLIC BLOOD PRESSURE: 80 MMHG | HEIGHT: 61 IN | WEIGHT: 184 LBS | SYSTOLIC BLOOD PRESSURE: 111 MMHG

## 2019-08-19 DIAGNOSIS — R10.31 RIGHT LOWER QUADRANT PAIN: ICD-10-CM

## 2019-08-19 DIAGNOSIS — M54.9 DORSALGIA, UNSPECIFIED: ICD-10-CM

## 2019-08-19 DIAGNOSIS — N83.201 UNSPECIFIED OVARIAN CYST, RIGHT SIDE: ICD-10-CM

## 2019-08-19 PROCEDURE — 99213 OFFICE O/P EST LOW 20 MIN: CPT | Mod: 25

## 2019-08-19 PROCEDURE — 76830 TRANSVAGINAL US NON-OB: CPT

## 2019-08-19 NOTE — HISTORY OF PRESENT ILLNESS
[Lower-Rt-Q] : lower right quadrant [Continuous] : no continuous [Dull] : dull [5/10] : is 5/10 in severity [Fever] : no fever [Nausea] : no nausea [Vomiting] : no vomiting [Diarrhea] : no diarrhea [Vaginal Bleeding] : no vaginal bleeding [Pelvic Pressure] : no pelvic pressure [Activity] : worsened by activity [Current IUD] : currently using an IUD

## 2019-08-31 ENCOUNTER — RX RENEWAL (OUTPATIENT)
Age: 49
End: 2019-08-31

## 2019-09-04 ENCOUNTER — RX RENEWAL (OUTPATIENT)
Age: 49
End: 2019-09-04

## 2019-09-30 ENCOUNTER — APPOINTMENT (OUTPATIENT)
Dept: INTERNAL MEDICINE | Facility: CLINIC | Age: 49
End: 2019-09-30
Payer: COMMERCIAL

## 2019-09-30 VITALS
RESPIRATION RATE: 14 BRPM | DIASTOLIC BLOOD PRESSURE: 80 MMHG | WEIGHT: 186 LBS | TEMPERATURE: 98.5 F | HEART RATE: 88 BPM | OXYGEN SATURATION: 98 % | SYSTOLIC BLOOD PRESSURE: 108 MMHG | BODY MASS INDEX: 35.14 KG/M2

## 2019-09-30 DIAGNOSIS — F17.200 NICOTINE DEPENDENCE, UNSPECIFIED, UNCOMPLICATED: ICD-10-CM

## 2019-09-30 DIAGNOSIS — N23 UNSPECIFIED RENAL COLIC: ICD-10-CM

## 2019-09-30 PROCEDURE — 99214 OFFICE O/P EST MOD 30 MIN: CPT | Mod: 25

## 2019-09-30 PROCEDURE — 36415 COLL VENOUS BLD VENIPUNCTURE: CPT

## 2019-09-30 NOTE — HISTORY OF PRESENT ILLNESS
[FreeTextEntry8] : Pt went to the ER at [facility] 3 weeks ago. She was diagnosed with a right sided kidney stone. Pt was given a referral to see a Urologist. She has an appointment later today.\par Pt has continued to have renal colic for the last three weeks which is worse in the morning.\par Pt would like to try chantix again for smoking cessation. She had a good experience with chantix previously and was off cigarettes  for about 2 years.

## 2019-09-30 NOTE — PLAN
[FreeTextEntry1] : Pt has appointment with Urology today regarding renal colic.\par Trial of chantix for smoking cessation, Taper off cigarettes in 1 week; complete 12 weeks of chantix

## 2019-09-30 NOTE — COUNSELING
[Risk of tobacco use and health benefits of smoking cessation discussed] : Risk of tobacco use and health benefits of smoking cessation discussed [Cessation strategies including cessation program discussed] : Cessation strategies including cessation program discussed [Willing to Quit Smoking] : Willing to quit smoking

## 2019-09-30 NOTE — PHYSICAL EXAM
[No Acute Distress] : no acute distress [Well Nourished] : well nourished [Well Developed] : well developed [Well-Appearing] : well-appearing [Normal Sclera/Conjunctiva] : normal sclera/conjunctiva [PERRL] : pupils equal round and reactive to light [EOMI] : extraocular movements intact [Normal Outer Ear/Nose] : the outer ears and nose were normal in appearance [Normal Oropharynx] : the oropharynx was normal [No JVD] : no jugular venous distention [No Lymphadenopathy] : no lymphadenopathy [Thyroid Normal, No Nodules] : the thyroid was normal and there were no nodules present [Supple] : supple [No Respiratory Distress] : no respiratory distress  [No Accessory Muscle Use] : no accessory muscle use [Clear to Auscultation] : lungs were clear to auscultation bilaterally [Normal Rate] : normal rate  [Normal S1, S2] : normal S1 and S2 [Regular Rhythm] : with a regular rhythm [No Murmur] : no murmur heard [No Carotid Bruits] : no carotid bruits [Pedal Pulses Present] : the pedal pulses are present [No Abdominal Bruit] : a ~M bruit was not heard ~T in the abdomen [No Varicosities] : no varicosities [No Palpable Aorta] : no palpable aorta [No Edema] : there was no peripheral edema [No Extremity Clubbing/Cyanosis] : no extremity clubbing/cyanosis [Non Tender] : non-tender [Soft] : abdomen soft [No Masses] : no abdominal mass palpated [Non-distended] : non-distended [No HSM] : no HSM [Normal Anterior Cervical Nodes] : no anterior cervical lymphadenopathy [Normal Bowel Sounds] : normal bowel sounds [Normal Posterior Cervical Nodes] : no posterior cervical lymphadenopathy [No Spinal Tenderness] : no spinal tenderness [No Joint Swelling] : no joint swelling [Grossly Normal Strength/Tone] : grossly normal strength/tone [Coordination Grossly Intact] : coordination grossly intact [No Rash] : no rash [Normal Gait] : normal gait [No Focal Deficits] : no focal deficits [Normal Affect] : the affect was normal [Deep Tendon Reflexes (DTR)] : deep tendon reflexes were 2+ and symmetric [Normal Insight/Judgement] : insight and judgment were intact [de-identified] : right flank tenderness

## 2019-10-02 LAB
25(OH)D3 SERPL-MCNC: 38.1 NG/ML
ALBUMIN SERPL ELPH-MCNC: 4.3 G/DL
ALP BLD-CCNC: 72 U/L
ALT SERPL-CCNC: 15 U/L
ANION GAP SERPL CALC-SCNC: 10 MMOL/L
APPEARANCE: CLEAR
AST SERPL-CCNC: 14 U/L
BASOPHILS # BLD AUTO: 0.03 K/UL
BASOPHILS NFR BLD AUTO: 0.4 %
BILIRUB SERPL-MCNC: 0.3 MG/DL
BILIRUBIN URINE: NEGATIVE
BLOOD URINE: NEGATIVE
BUN SERPL-MCNC: 14 MG/DL
CALCIUM SERPL-MCNC: 9.5 MG/DL
CHLORIDE SERPL-SCNC: 105 MMOL/L
CHOLEST SERPL-MCNC: 189 MG/DL
CHOLEST/HDLC SERPL: 3.4 RATIO
CO2 SERPL-SCNC: 25 MMOL/L
COLOR: YELLOW
CREAT SERPL-MCNC: 0.75 MG/DL
EOSINOPHIL # BLD AUTO: 0.24 K/UL
EOSINOPHIL NFR BLD AUTO: 3.4 %
ESTIMATED AVERAGE GLUCOSE: 105 MG/DL
FOLATE SERPL-MCNC: 8.5 NG/ML
GLUCOSE QUALITATIVE U: NEGATIVE
GLUCOSE SERPL-MCNC: 104 MG/DL
HBA1C MFR BLD HPLC: 5.3 %
HCT VFR BLD CALC: 45 %
HDLC SERPL-MCNC: 56 MG/DL
HGB BLD-MCNC: 14.8 G/DL
IMM GRANULOCYTES NFR BLD AUTO: 0.3 %
KETONES URINE: NEGATIVE
LDLC SERPL CALC-MCNC: 118 MG/DL
LEUKOCYTE ESTERASE URINE: NEGATIVE
LYMPHOCYTES # BLD AUTO: 1.73 K/UL
LYMPHOCYTES NFR BLD AUTO: 24.5 %
MAN DIFF?: NORMAL
MCHC RBC-ENTMCNC: 30.8 PG
MCHC RBC-ENTMCNC: 32.9 GM/DL
MCV RBC AUTO: 93.6 FL
MONOCYTES # BLD AUTO: 0.5 K/UL
MONOCYTES NFR BLD AUTO: 7.1 %
NEUTROPHILS # BLD AUTO: 4.54 K/UL
NEUTROPHILS NFR BLD AUTO: 64.3 %
NITRITE URINE: NEGATIVE
PH URINE: 5.5
PLATELET # BLD AUTO: 397 K/UL
POTASSIUM SERPL-SCNC: 4.8 MMOL/L
PROT SERPL-MCNC: 7.2 G/DL
PROTEIN URINE: NORMAL
RBC # BLD: 4.81 M/UL
RBC # FLD: 12.9 %
SODIUM SERPL-SCNC: 140 MMOL/L
SPECIFIC GRAVITY URINE: 1.03
TRIGL SERPL-MCNC: 77 MG/DL
TSH SERPL-ACNC: 1.29 UIU/ML
UROBILINOGEN URINE: NORMAL
VIT B12 SERPL-MCNC: 343 PG/ML
WBC # FLD AUTO: 7.06 K/UL

## 2019-10-25 ENCOUNTER — RX RENEWAL (OUTPATIENT)
Age: 49
End: 2019-10-25

## 2019-12-12 ENCOUNTER — APPOINTMENT (OUTPATIENT)
Dept: COLORECTAL SURGERY | Facility: CLINIC | Age: 49
End: 2019-12-12
Payer: COMMERCIAL

## 2019-12-12 VITALS
HEIGHT: 61 IN | RESPIRATION RATE: 14 BRPM | SYSTOLIC BLOOD PRESSURE: 116 MMHG | HEART RATE: 83 BPM | TEMPERATURE: 98.7 F | DIASTOLIC BLOOD PRESSURE: 72 MMHG

## 2019-12-12 DIAGNOSIS — L73.9 FOLLICULAR DISORDER, UNSPECIFIED: ICD-10-CM

## 2019-12-12 PROCEDURE — 99243 OFF/OP CNSLTJ NEW/EST LOW 30: CPT | Mod: 25

## 2019-12-12 PROCEDURE — 46600 DIAGNOSTIC ANOSCOPY SPX: CPT

## 2019-12-12 NOTE — PHYSICAL EXAM
[Abdomen Masses] : No abdominal masses [Tender] : nontender [None] : no anal fissures seen [JVD] : no jugular venous distention  [Normal] : was normal [Respiratory Effort] : normal respiratory effort [No Rash or Lesion] : No rash or lesion [Normal Rate and Rhythm] : normal rate and rhythm [Oriented to Person] : oriented to person [Alert] : alert [Calm] : calm [Oriented to Place] : oriented to place [de-identified] : folliculitis of left buttock [de-identified] : NAD [de-identified] :  normocephalic, atraumatic.

## 2019-12-12 NOTE — HISTORY OF PRESENT ILLNESS
[FreeTextEntry1] : 49-year-old female presented with a small lump on her perianal skin. She had a history of a perianal abscess 3 years ago I was concerned that this lump was similar. She reports that the small lump has decreased in size over the last few days and is no longer painful. She's had 2 previous fistulotomy site in the past. She denies any anorectal leakage.

## 2019-12-12 NOTE — ASSESSMENT
[FreeTextEntry1] : 49-year-old female presented with folliculitis of the left buttock. Recommend high compress. She should continue with high-fiber diet. She'll follow up as needed.

## 2020-01-09 ENCOUNTER — RX RENEWAL (OUTPATIENT)
Age: 50
End: 2020-01-09

## 2020-06-14 ENCOUNTER — RX RENEWAL (OUTPATIENT)
Age: 50
End: 2020-06-14

## 2020-07-02 ENCOUNTER — NON-APPOINTMENT (OUTPATIENT)
Age: 50
End: 2020-07-02

## 2020-07-02 ENCOUNTER — APPOINTMENT (OUTPATIENT)
Dept: INTERNAL MEDICINE | Facility: CLINIC | Age: 50
End: 2020-07-02
Payer: COMMERCIAL

## 2020-07-02 VITALS
HEIGHT: 61 IN | SYSTOLIC BLOOD PRESSURE: 110 MMHG | HEART RATE: 91 BPM | DIASTOLIC BLOOD PRESSURE: 72 MMHG | WEIGHT: 176 LBS | RESPIRATION RATE: 14 BRPM | OXYGEN SATURATION: 97 % | BODY MASS INDEX: 33.23 KG/M2

## 2020-07-02 DIAGNOSIS — Z11.3 ENCOUNTER FOR SCREENING FOR INFECTIONS WITH A PREDOMINANTLY SEXUAL MODE OF TRANSMISSION: ICD-10-CM

## 2020-07-02 DIAGNOSIS — E55.9 VITAMIN D DEFICIENCY, UNSPECIFIED: ICD-10-CM

## 2020-07-02 DIAGNOSIS — Z00.00 ENCOUNTER FOR GENERAL ADULT MEDICAL EXAMINATION W/OUT ABNORMAL FINDINGS: ICD-10-CM

## 2020-07-02 DIAGNOSIS — L98.9 DISORDER OF THE SKIN AND SUBCUTANEOUS TISSUE, UNSPECIFIED: ICD-10-CM

## 2020-07-02 PROCEDURE — 99396 PREV VISIT EST AGE 40-64: CPT | Mod: 25

## 2020-07-02 PROCEDURE — 93000 ELECTROCARDIOGRAM COMPLETE: CPT

## 2020-07-02 PROCEDURE — 36415 COLL VENOUS BLD VENIPUNCTURE: CPT

## 2020-07-02 RX ORDER — VARENICLINE TARTRATE 0.5 (11)-1
0.5 MG X 11 & KIT ORAL
Qty: 1 | Refills: 0 | Status: DISCONTINUED | COMMUNITY
Start: 2019-09-30 | End: 2020-07-02

## 2020-07-02 NOTE — HISTORY OF PRESENT ILLNESS
[FreeTextEntry1] : Here for  annual physical.\par Overall feeling well' [de-identified] : Pt got her left leg caught in a folding chair about 2 weeks ago, She tried an OTC cream to reduce bruising.The skin in the area is darker. Pt thinks that she had an adverse reaction to the cream\par Doesn't smoke; quit about 7 months ago. Social alcohol.\par Exercises daily\par Last GYN check-up and mammogram over 1 year ago.,\par Pt requests HIV/STD testing

## 2020-07-02 NOTE — HEALTH RISK ASSESSMENT
[Very Good] : ~his/her~ current health as very good [Good] : ~his/her~  mood as  good [Yes] : Yes [] : No [de-identified] : social [de-identified] : former smoker

## 2020-07-02 NOTE — PHYSICAL EXAM
[No Acute Distress] : no acute distress [Well-Appearing] : well-appearing [Well Developed] : well developed [Well Nourished] : well nourished [Normal Sclera/Conjunctiva] : normal sclera/conjunctiva [PERRL] : pupils equal round and reactive to light [EOMI] : extraocular movements intact [Normal Outer Ear/Nose] : the outer ears and nose were normal in appearance [Normal Oropharynx] : the oropharynx was normal [No JVD] : no jugular venous distention [Supple] : supple [No Lymphadenopathy] : no lymphadenopathy [Thyroid Normal, No Nodules] : the thyroid was normal and there were no nodules present [Clear to Auscultation] : lungs were clear to auscultation bilaterally [No Accessory Muscle Use] : no accessory muscle use [No Respiratory Distress] : no respiratory distress  [Normal Rate] : normal rate  [Normal S1, S2] : normal S1 and S2 [Regular Rhythm] : with a regular rhythm [No Carotid Bruits] : no carotid bruits [No Murmur] : no murmur heard [No Abdominal Bruit] : a ~M bruit was not heard ~T in the abdomen [Pedal Pulses Present] : the pedal pulses are present [No Varicosities] : no varicosities [No Edema] : there was no peripheral edema [No Palpable Aorta] : no palpable aorta [No Extremity Clubbing/Cyanosis] : no extremity clubbing/cyanosis [Soft] : abdomen soft [Non Tender] : non-tender [Non-distended] : non-distended [No Masses] : no abdominal mass palpated [No HSM] : no HSM [Normal Bowel Sounds] : normal bowel sounds [Normal Anterior Cervical Nodes] : no anterior cervical lymphadenopathy [Normal Posterior Cervical Nodes] : no posterior cervical lymphadenopathy [No Spinal Tenderness] : no spinal tenderness [No Joint Swelling] : no joint swelling [No CVA Tenderness] : no CVA  tenderness [No Rash] : no rash [Grossly Normal Strength/Tone] : grossly normal strength/tone [Coordination Grossly Intact] : coordination grossly intact [Deep Tendon Reflexes (DTR)] : deep tendon reflexes were 2+ and symmetric [No Focal Deficits] : no focal deficits [Normal Gait] : normal gait [Normal Insight/Judgement] : insight and judgment were intact [Normal Affect] : the affect was normal [de-identified] : Dark area of skin on lateral left calf

## 2020-07-12 LAB
25(OH)D3 SERPL-MCNC: 39.2 NG/ML
ALBUMIN SERPL ELPH-MCNC: 4.2 G/DL
ALP BLD-CCNC: 70 U/L
ALT SERPL-CCNC: 14 U/L
ANION GAP SERPL CALC-SCNC: 14 MMOL/L
APPEARANCE: CLEAR
AST SERPL-CCNC: 18 U/L
BASOPHILS # BLD AUTO: 0.03 K/UL
BASOPHILS NFR BLD AUTO: 0.5 %
BILIRUB SERPL-MCNC: 0.4 MG/DL
BILIRUBIN URINE: NEGATIVE
BLOOD URINE: NEGATIVE
BUN SERPL-MCNC: 10 MG/DL
C TRACH RRNA SPEC QL NAA+PROBE: NOT DETECTED
CALCIUM SERPL-MCNC: 9.7 MG/DL
CHLORIDE SERPL-SCNC: 103 MMOL/L
CHOLEST SERPL-MCNC: 197 MG/DL
CHOLEST/HDLC SERPL: 3.1 RATIO
CO2 SERPL-SCNC: 24 MMOL/L
COLOR: YELLOW
CREAT SERPL-MCNC: 0.78 MG/DL
EOSINOPHIL # BLD AUTO: 0.16 K/UL
EOSINOPHIL NFR BLD AUTO: 2.6 %
ESTIMATED AVERAGE GLUCOSE: 103 MG/DL
FOLATE SERPL-MCNC: 6.7 NG/ML
GLUCOSE QUALITATIVE U: NEGATIVE
GLUCOSE SERPL-MCNC: 85 MG/DL
HAV IGM SER QL: NONREACTIVE
HBA1C MFR BLD HPLC: 5.2 %
HBV CORE IGM SER QL: NONREACTIVE
HBV SURFACE AG SER QL: NONREACTIVE
HCT VFR BLD CALC: 46.4 %
HCV AB SER QL: NONREACTIVE
HCV S/CO RATIO: 0.07 S/CO
HDLC SERPL-MCNC: 64 MG/DL
HGB BLD-MCNC: 14.6 G/DL
HIV1+2 AB SPEC QL IA.RAPID: NONREACTIVE
HSV 1+2 IGG SER IA-IMP: NEGATIVE
HSV 1+2 IGG SER IA-IMP: POSITIVE
HSV1 IGG SER QL: 53.5 INDEX
HSV1 IGM SER QL: NORMAL TITER
HSV2 AB FLD-ACNC: NORMAL TITER
HSV2 IGG SER QL: 0.12 INDEX
IMM GRANULOCYTES NFR BLD AUTO: 0.3 %
KETONES URINE: NEGATIVE
LDLC SERPL CALC-MCNC: 114 MG/DL
LEUKOCYTE ESTERASE URINE: NEGATIVE
LYMPHOCYTES # BLD AUTO: 2.05 K/UL
LYMPHOCYTES NFR BLD AUTO: 32.7 %
MAN DIFF?: NORMAL
MCHC RBC-ENTMCNC: 31.3 PG
MCHC RBC-ENTMCNC: 31.5 GM/DL
MCV RBC AUTO: 99.4 FL
MONOCYTES # BLD AUTO: 0.63 K/UL
MONOCYTES NFR BLD AUTO: 10.1 %
N GONORRHOEA RRNA SPEC QL NAA+PROBE: NOT DETECTED
NEUTROPHILS # BLD AUTO: 3.37 K/UL
NEUTROPHILS NFR BLD AUTO: 53.8 %
NITRITE URINE: NEGATIVE
PH URINE: 6
PLATELET # BLD AUTO: 393 K/UL
POTASSIUM SERPL-SCNC: 5.3 MMOL/L
PROT SERPL-MCNC: 6.8 G/DL
PROTEIN URINE: NEGATIVE
RBC # BLD: 4.67 M/UL
RBC # FLD: 13.2 %
SODIUM SERPL-SCNC: 141 MMOL/L
SOURCE AMPLIFICATION: NORMAL
SPECIFIC GRAVITY URINE: 1.02
T PALLIDUM AB SER QL IA: NEGATIVE
TRIGL SERPL-MCNC: 93 MG/DL
TSH SERPL-ACNC: 2.42 UIU/ML
UROBILINOGEN URINE: NORMAL
VIT B12 SERPL-MCNC: 273 PG/ML
WBC # FLD AUTO: 6.26 K/UL

## 2020-08-27 NOTE — ED PROVIDER NOTE - LATERALITY
Adderall 30mg, 1 tab po BID  Last ordered 7/28/2020, 60 tabs, 0 refills    LOV; 5/28/20 for ADHD, AVS wants follow up around 11/28/20.    I don't have access to Dr. Berry's PDMP.   left

## 2020-10-16 ENCOUNTER — APPOINTMENT (OUTPATIENT)
Dept: OBGYN | Facility: CLINIC | Age: 50
End: 2020-10-16
Payer: COMMERCIAL

## 2020-10-16 VITALS
BODY MASS INDEX: 33.99 KG/M2 | HEIGHT: 61 IN | DIASTOLIC BLOOD PRESSURE: 86 MMHG | WEIGHT: 180 LBS | SYSTOLIC BLOOD PRESSURE: 122 MMHG

## 2020-10-16 DIAGNOSIS — Z01.419 ENCOUNTER FOR GYNECOLOGICAL EXAMINATION (GENERAL) (ROUTINE) W/OUT ABNORMAL FINDINGS: ICD-10-CM

## 2020-10-16 PROCEDURE — 99396 PREV VISIT EST AGE 40-64: CPT

## 2020-10-16 NOTE — HISTORY OF PRESENT ILLNESS
[TextBox_4] : Pt states boyfriend felt something inside - not sure if it was IUD strings. Pt states she loves the IUD.

## 2020-10-19 LAB — HPV HIGH+LOW RISK DNA PNL CVX: NOT DETECTED

## 2020-10-19 NOTE — DISCHARGE NOTE ADULT - NS AS DC FU INST LIST INST
----- Message from Leticia Miles sent at 10/19/2020  7:30 AM CDT -----  Regarding: call back  Contact: [phone]  Type: Appointment Request    Caller is requesting an appointment.    Name of Caller: KI BAIG [[id number]]  When is the first available appointment? November  Symptoms: Rash on vaginal area and burning   Best Call Back Number: [phone]  Additional Information: none      
Returned call, pt states she is experiencing a rash around her vaginal area, burns when she urinates. Offered pt appt today, we had an opening. Pt declines, states she will only be able to come tomorrow. Scheduled for 10/20 at 10:15am. Patient verbalized understanding.  
no

## 2020-10-23 LAB — CYTOLOGY CVX/VAG DOC THIN PREP: ABNORMAL

## 2020-10-27 ENCOUNTER — NON-APPOINTMENT (OUTPATIENT)
Age: 50
End: 2020-10-27

## 2020-10-28 ENCOUNTER — NON-APPOINTMENT (OUTPATIENT)
Age: 50
End: 2020-10-28

## 2020-11-06 ENCOUNTER — APPOINTMENT (OUTPATIENT)
Dept: OBGYN | Facility: CLINIC | Age: 50
End: 2020-11-06

## 2020-12-08 ENCOUNTER — RX RENEWAL (OUTPATIENT)
Age: 50
End: 2020-12-08

## 2020-12-16 PROBLEM — Z87.09 HISTORY OF ACUTE BRONCHITIS: Status: RESOLVED | Noted: 2018-01-29 | Resolved: 2020-12-16

## 2020-12-23 PROBLEM — Z01.419 ENCOUNTER FOR ANNUAL ROUTINE GYNECOLOGICAL EXAMINATION: Status: RESOLVED | Noted: 2019-03-04 | Resolved: 2020-12-23

## 2021-01-14 ENCOUNTER — APPOINTMENT (OUTPATIENT)
Dept: RADIOLOGY | Facility: CLINIC | Age: 51
End: 2021-01-14
Payer: COMMERCIAL

## 2021-01-14 ENCOUNTER — OUTPATIENT (OUTPATIENT)
Dept: OUTPATIENT SERVICES | Facility: HOSPITAL | Age: 51
LOS: 1 days | End: 2021-01-14
Payer: COMMERCIAL

## 2021-01-14 ENCOUNTER — APPOINTMENT (OUTPATIENT)
Dept: INTERNAL MEDICINE | Facility: CLINIC | Age: 51
End: 2021-01-14
Payer: COMMERCIAL

## 2021-01-14 VITALS
BODY MASS INDEX: 36.06 KG/M2 | SYSTOLIC BLOOD PRESSURE: 118 MMHG | HEIGHT: 61 IN | TEMPERATURE: 98.1 F | OXYGEN SATURATION: 96 % | RESPIRATION RATE: 14 BRPM | WEIGHT: 191 LBS | DIASTOLIC BLOOD PRESSURE: 80 MMHG | HEART RATE: 89 BPM

## 2021-01-14 DIAGNOSIS — K60.3 ANAL FISTULA: Chronic | ICD-10-CM

## 2021-01-14 DIAGNOSIS — T14.90XA INJURY, UNSPECIFIED, INITIAL ENCOUNTER: ICD-10-CM

## 2021-01-14 DIAGNOSIS — F17.200 NICOTINE DEPENDENCE, UNSPECIFIED, UNCOMPLICATED: ICD-10-CM

## 2021-01-14 DIAGNOSIS — M54.2 CERVICALGIA: ICD-10-CM

## 2021-01-14 PROCEDURE — 72052 X-RAY EXAM NECK SPINE 6/>VWS: CPT | Mod: 26

## 2021-01-14 PROCEDURE — 72052 X-RAY EXAM NECK SPINE 6/>VWS: CPT

## 2021-01-14 PROCEDURE — 99072 ADDL SUPL MATRL&STAF TM PHE: CPT

## 2021-01-14 PROCEDURE — 99214 OFFICE O/P EST MOD 30 MIN: CPT
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
## 2021-01-29 ENCOUNTER — APPOINTMENT (OUTPATIENT)
Dept: MRI IMAGING | Facility: CLINIC | Age: 51
End: 2021-01-29
Payer: COMMERCIAL

## 2021-01-29 ENCOUNTER — RESULT REVIEW (OUTPATIENT)
Age: 51
End: 2021-01-29

## 2021-01-29 ENCOUNTER — OUTPATIENT (OUTPATIENT)
Dept: OUTPATIENT SERVICES | Facility: HOSPITAL | Age: 51
LOS: 1 days | End: 2021-01-29
Payer: COMMERCIAL

## 2021-01-29 DIAGNOSIS — K60.3 ANAL FISTULA: Chronic | ICD-10-CM

## 2021-01-29 DIAGNOSIS — M54.2 CERVICALGIA: ICD-10-CM

## 2021-01-29 PROCEDURE — 72141 MRI NECK SPINE W/O DYE: CPT

## 2021-01-29 PROCEDURE — 72141 MRI NECK SPINE W/O DYE: CPT | Mod: 26

## 2021-02-02 DIAGNOSIS — M50.20 OTHER CERVICAL DISC DISPLACEMENT, UNSPECIFIED CERVICAL REGION: ICD-10-CM

## 2021-02-08 ENCOUNTER — APPOINTMENT (OUTPATIENT)
Dept: ORTHOPEDIC SURGERY | Facility: CLINIC | Age: 51
End: 2021-02-08

## 2021-03-29 ENCOUNTER — TRANSCRIPTION ENCOUNTER (OUTPATIENT)
Age: 51
End: 2021-03-29

## 2021-04-29 ENCOUNTER — NON-APPOINTMENT (OUTPATIENT)
Age: 51
End: 2021-04-29

## 2021-04-30 ENCOUNTER — APPOINTMENT (OUTPATIENT)
Dept: OBGYN | Facility: CLINIC | Age: 51
End: 2021-04-30
Payer: COMMERCIAL

## 2021-04-30 ENCOUNTER — ASOB RESULT (OUTPATIENT)
Age: 51
End: 2021-04-30

## 2021-04-30 PROCEDURE — 76830 TRANSVAGINAL US NON-OB: CPT

## 2021-04-30 PROCEDURE — 99072 ADDL SUPL MATRL&STAF TM PHE: CPT

## 2021-05-06 ENCOUNTER — NON-APPOINTMENT (OUTPATIENT)
Age: 51
End: 2021-05-06

## 2021-06-14 ENCOUNTER — NON-APPOINTMENT (OUTPATIENT)
Age: 51
End: 2021-06-14

## 2021-06-14 DIAGNOSIS — N39.0 URINARY TRACT INFECTION, SITE NOT SPECIFIED: ICD-10-CM

## 2021-06-21 ENCOUNTER — LABORATORY RESULT (OUTPATIENT)
Age: 51
End: 2021-06-21

## 2021-06-21 ENCOUNTER — APPOINTMENT (OUTPATIENT)
Dept: INTERNAL MEDICINE | Facility: CLINIC | Age: 51
End: 2021-06-21
Payer: COMMERCIAL

## 2021-06-21 VITALS
SYSTOLIC BLOOD PRESSURE: 110 MMHG | TEMPERATURE: 97.7 F | BODY MASS INDEX: 34.2 KG/M2 | DIASTOLIC BLOOD PRESSURE: 80 MMHG | HEART RATE: 63 BPM | WEIGHT: 181 LBS | RESPIRATION RATE: 14 BRPM | OXYGEN SATURATION: 98 %

## 2021-06-21 DIAGNOSIS — S69.91XA UNSPECIFIED INJURY OF RIGHT WRIST, HAND AND FINGER(S), INITIAL ENCOUNTER: ICD-10-CM
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
## 2021-06-21 PROCEDURE — 99214 OFFICE O/P EST MOD 30 MIN: CPT

## 2021-06-21 PROCEDURE — 99072 ADDL SUPL MATRL&STAF TM PHE: CPT

## 2021-06-21 NOTE — HISTORY OF PRESENT ILLNESS
[FreeTextEntry8] : Pt removed a tick from her neck 2 days ago. She was hiking on a trail the previous day. The tick was on her for less than 24 hours.\par Pt is c/o discomfort in her Right middle finger for over a month. She feels a snapping sensation. The finger is swollen.

## 2021-06-21 NOTE — PHYSICAL EXAM
[No Acute Distress] : no acute distress [Well Nourished] : well nourished [Well Developed] : well developed [Well-Appearing] : well-appearing [Normal Sclera/Conjunctiva] : normal sclera/conjunctiva [PERRL] : pupils equal round and reactive to light [EOMI] : extraocular movements intact [Normal Outer Ear/Nose] : the outer ears and nose were normal in appearance [Normal Oropharynx] : the oropharynx was normal [No JVD] : no jugular venous distention [No Lymphadenopathy] : no lymphadenopathy [Supple] : supple [No Respiratory Distress] : no respiratory distress  [Thyroid Normal, No Nodules] : the thyroid was normal and there were no nodules present [No Accessory Muscle Use] : no accessory muscle use [Clear to Auscultation] : lungs were clear to auscultation bilaterally [Normal Rate] : normal rate  [Regular Rhythm] : with a regular rhythm [Normal S1, S2] : normal S1 and S2 [No Murmur] : no murmur heard [No Carotid Bruits] : no carotid bruits [No Abdominal Bruit] : a ~M bruit was not heard ~T in the abdomen [No Varicosities] : no varicosities [Pedal Pulses Present] : the pedal pulses are present [No Edema] : there was no peripheral edema [No Palpable Aorta] : no palpable aorta [Soft] : abdomen soft [No Extremity Clubbing/Cyanosis] : no extremity clubbing/cyanosis [Non Tender] : non-tender [Non-distended] : non-distended [No Masses] : no abdominal mass palpated [No HSM] : no HSM [Normal Bowel Sounds] : normal bowel sounds [Normal Posterior Cervical Nodes] : no posterior cervical lymphadenopathy [Normal Anterior Cervical Nodes] : no anterior cervical lymphadenopathy [No CVA Tenderness] : no CVA  tenderness [No Spinal Tenderness] : no spinal tenderness [No Joint Swelling] : no joint swelling [Grossly Normal Strength/Tone] : grossly normal strength/tone [No Rash] : no rash [Coordination Grossly Intact] : coordination grossly intact [No Focal Deficits] : no focal deficits [Deep Tendon Reflexes (DTR)] : deep tendon reflexes were 2+ and symmetric [Normal Gait] : normal gait [Normal Affect] : the affect was normal [Normal Insight/Judgement] : insight and judgment were intact

## 2021-06-24 LAB
B BURGDOR IGG+IGM SER QL IB: NORMAL
BABESIA ANTIBODIES, IGG: NORMAL
BABESIA ANTIBODIES, IGM: NORMAL

## 2021-06-25 LAB
B BURGDOR AB SER-IMP: NEGATIVE
B BURGDOR IGM PATRN SER IB-IMP: NEGATIVE
B BURGDOR18KD IGG SER QL IB: NORMAL
B BURGDOR23KD IGG SER QL IB: NORMAL
B BURGDOR23KD IGM SER QL IB: NORMAL
B BURGDOR28KD IGG SER QL IB: NORMAL
B BURGDOR30KD IGG SER QL IB: NORMAL
B BURGDOR31KD IGG SER QL IB: NORMAL
B BURGDOR39KD IGG SER QL IB: NORMAL
B BURGDOR39KD IGM SER QL IB: NORMAL
B BURGDOR41KD IGG SER QL IB: NORMAL
B BURGDOR41KD IGM SER QL IB: NORMAL
B BURGDOR45KD IGG SER QL IB: NORMAL
B BURGDOR58KD IGG SER QL IB: NORMAL
B BURGDOR66KD IGG SER QL IB: NORMAL
B BURGDOR93KD IGG SER QL IB: NORMAL

## 2021-07-12 NOTE — ASU PATIENT PROFILE, ADULT - DOES PATIENT HAVE ADVANCE DIRECTIVE
HCP form given in PST/No

## 2021-08-31 ENCOUNTER — APPOINTMENT (OUTPATIENT)
Dept: INTERNAL MEDICINE | Facility: CLINIC | Age: 51
End: 2021-08-31
Payer: COMMERCIAL

## 2021-08-31 ENCOUNTER — LABORATORY RESULT (OUTPATIENT)
Age: 51
End: 2021-08-31

## 2021-08-31 VITALS
RESPIRATION RATE: 14 BRPM | DIASTOLIC BLOOD PRESSURE: 80 MMHG | SYSTOLIC BLOOD PRESSURE: 120 MMHG | HEART RATE: 72 BPM | OXYGEN SATURATION: 97 % | TEMPERATURE: 97.5 F

## 2021-08-31 DIAGNOSIS — K21.9 GASTRO-ESOPHAGEAL REFLUX DISEASE W/OUT ESOPHAGITIS: ICD-10-CM
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
## 2021-08-31 DIAGNOSIS — M65.341 TRIGGER FINGER, RIGHT RING FINGER: ICD-10-CM

## 2021-08-31 PROCEDURE — 99213 OFFICE O/P EST LOW 20 MIN: CPT

## 2021-08-31 NOTE — HISTORY OF PRESENT ILLNESS
[FreeTextEntry1] : Here for follow-up.\par Pt has no symptoms of Lyme Disease [de-identified] : Here for foloow-up regarding tick bite in June\par Asymptomatic

## 2021-09-09 ENCOUNTER — NON-APPOINTMENT (OUTPATIENT)
Age: 51
End: 2021-09-09

## 2021-09-09 ENCOUNTER — TRANSCRIPTION ENCOUNTER (OUTPATIENT)
Age: 51
End: 2021-09-09

## 2021-09-10 ENCOUNTER — OUTPATIENT (OUTPATIENT)
Dept: OUTPATIENT SERVICES | Facility: HOSPITAL | Age: 51
LOS: 1 days | End: 2021-09-10
Payer: COMMERCIAL

## 2021-09-10 ENCOUNTER — APPOINTMENT (OUTPATIENT)
Dept: DISASTER EMERGENCY | Facility: HOSPITAL | Age: 51
End: 2021-09-10

## 2021-09-10 VITALS
RESPIRATION RATE: 17 BRPM | SYSTOLIC BLOOD PRESSURE: 100 MMHG | OXYGEN SATURATION: 100 % | TEMPERATURE: 98 F | HEART RATE: 78 BPM | DIASTOLIC BLOOD PRESSURE: 63 MMHG

## 2021-09-10 VITALS
WEIGHT: 169.98 LBS | OXYGEN SATURATION: 99 % | RESPIRATION RATE: 19 BRPM | HEART RATE: 81 BPM | TEMPERATURE: 99 F | DIASTOLIC BLOOD PRESSURE: 73 MMHG | HEIGHT: 61 IN | SYSTOLIC BLOOD PRESSURE: 107 MMHG

## 2021-09-10 DIAGNOSIS — K60.3 ANAL FISTULA: Chronic | ICD-10-CM

## 2021-09-10 DIAGNOSIS — U07.1 COVID-19: ICD-10-CM

## 2021-09-10 PROCEDURE — M0243: CPT

## 2021-09-10 RX ORDER — SODIUM CHLORIDE 9 MG/ML
250 INJECTION INTRAMUSCULAR; INTRAVENOUS; SUBCUTANEOUS
Refills: 0 | Status: COMPLETED | OUTPATIENT
Start: 2021-09-10 | End: 2021-09-10

## 2021-09-10 RX ADMIN — SODIUM CHLORIDE 310 MILLILITER(S): 9 INJECTION INTRAMUSCULAR; INTRAVENOUS; SUBCUTANEOUS at 11:31

## 2021-09-10 NOTE — CHART NOTE - NSCHARTNOTEFT_GEN_A_CORE
I have reviewed the  Casirivimab/Imdevimab Emergency Use Authorization (EAU) and I have provided the patient or patient's caregiver with the following information:  1. FDA has authorized emergency use of Casirivimab/Imdevimab , which is not FDA-approved biologic product.  2. The patient or patient's caregiver has the option to accept or refuse administration of Casirivimab  3. The significant risks and benefits are unknown.  4. Information on available alternative treatments and risks and benefits of those alternatives.    Discharge:  T(C): 36.9 (09-10-21 @ 12:53), Max: 37.5 (09-10-21 @ 11:53)  HR: 78 (09-10-21 @ 12:53) (71 - 81)  BP: 100/63 (09-10-21 @ 12:53) (100/62 - 107/73)  RR: 17 (09-10-21 @ 12:53) (17 - 19)  SpO2: 100% (09-10-21 @ 12:53) (97% - 100%)  Patient tolerated infusion well denies complaints of chest pain/SOB/dizzines/ palps  VSS for discharge home  D/C instructions given/ fact sheet included.  Patient to follow-up with PCP as needed

## 2021-09-10 NOTE — CHART NOTE - NSCHARTNOTEFT_GEN_A_CORE
CC: Monoclonal Antibody Infusion/COVID 19 Positive      History: Patient presents for infusion of monoclonal antibody infusion. Patient has been screened and was deemed to be a candidate.    Exposure: unknown exposure    Symptoms/ Criteria: fever, cough, headache, chills, cough, vomiting    Inclusion Criteria: BMI > 35    Date of Positive Test: verified by clinical call center     Date of symptom onset: 9/3    Vaccine status: First Pfizer vaccine 9/2    PMHx:  Infection due to severe acute respiratory syndrome coronavirus 2 (SARS-CoV-2)    Diabetes mellitus (Mother)    History of hypertension (Mother)    Anal fistula    Hemorrhoids    Leiomyoma of uterus    Anal fistula    COVID-19          T(C): 37.3 (09-10-21 @ 11:44), Max: 37.4 (09-10-21 @ 11:34)  HR: 71 (09-10-21 @ 11:44) (71 - 81)  BP: 100/70 (09-10-21 @ 11:44) (100/70 - 107/73)  RR: 19 (09-10-21 @ 11:44) (19 - 19)  SpO2: 97% (09-10-21 @ 11:44) (97% - 99%)      PE:   Appearance: NAD	  HEENT:   Normal oral mucosa.   Lymphatic: No lymphadenopathy  Cardiovascular: Normal S1 S2, No JVD, No murmurs, No edema  Respiratory: Lungs clear to auscultation	  Gastrointestinal:  Soft, Non-tender. No guarding   Skin: warm and dry  Neurologic: Non-focal  Extremities: Normal range of motion.     ASSESSMENT:  Pt is a 50y year old Female without significant  PMH  Covid +  referred to the infusion center for Monoclonal antibody infusion (Regeneron).        PLAN:  - infusion procedure explained to patient   - Consent for monoclonal antibody infusion obtained   - Risk & benefits discussed/all questions answered  - infuse Regeneron per protocol  - will observe patient for one hour post infusion  and then if stable discharge home with outpt follow up as planned by PMD.        - Patient tolerated infusion well denies complaints of chest pain/SOB/dizziness/ palpitations  - VSS for discharge home  - D/C instructions given/ fact sheet included.  - Patient to follow-up with PCP as needed.

## 2021-09-11 ENCOUNTER — TRANSCRIPTION ENCOUNTER (OUTPATIENT)
Age: 51
End: 2021-09-11

## 2021-09-12 ENCOUNTER — TRANSCRIPTION ENCOUNTER (OUTPATIENT)
Age: 51
End: 2021-09-12

## 2021-12-08 ENCOUNTER — TRANSCRIPTION ENCOUNTER (OUTPATIENT)
Age: 51
End: 2021-12-08

## 2021-12-09 DIAGNOSIS — J20.9 ACUTE BRONCHITIS, UNSPECIFIED: ICD-10-CM

## 2022-01-31 ENCOUNTER — APPOINTMENT (OUTPATIENT)
Dept: OBGYN | Facility: CLINIC | Age: 52
End: 2022-01-31
Payer: COMMERCIAL

## 2022-01-31 VITALS — BODY MASS INDEX: 35.33 KG/M2 | WEIGHT: 187 LBS | DIASTOLIC BLOOD PRESSURE: 72 MMHG | SYSTOLIC BLOOD PRESSURE: 124 MMHG

## 2022-01-31 PROCEDURE — 99396 PREV VISIT EST AGE 40-64: CPT

## 2022-01-31 RX ORDER — METHYLPREDNISOLONE 4 MG/1
4 TABLET ORAL
Qty: 1 | Refills: 0 | Status: COMPLETED | COMMUNITY
Start: 2021-01-14 | End: 2022-01-31

## 2022-01-31 RX ORDER — VARENICLINE TARTRATE 0.5 (11)-1
0.5 MG X 11 & KIT ORAL
Qty: 1 | Refills: 0 | Status: COMPLETED | COMMUNITY
Start: 2021-01-14 | End: 2022-01-31

## 2022-01-31 RX ORDER — CYCLOBENZAPRINE HYDROCHLORIDE 10 MG/1
10 TABLET, FILM COATED ORAL 3 TIMES DAILY
Qty: 20 | Refills: 0 | Status: COMPLETED | COMMUNITY
Start: 2021-01-14 | End: 2022-01-31

## 2022-01-31 RX ORDER — AZITHROMYCIN 250 MG/1
250 TABLET, FILM COATED ORAL
Qty: 1 | Refills: 0 | Status: COMPLETED | COMMUNITY
Start: 2021-12-09 | End: 2022-01-31

## 2022-01-31 RX ORDER — NITROFURANTOIN (MONOHYDRATE/MACROCRYSTALS) 25; 75 MG/1; MG/1
100 CAPSULE ORAL TWICE DAILY
Qty: 14 | Refills: 0 | Status: COMPLETED | COMMUNITY
Start: 2021-06-14 | End: 2022-01-31

## 2022-01-31 RX ORDER — DOXYCYCLINE HYCLATE 100 MG/1
100 CAPSULE ORAL
Qty: 2 | Refills: 0 | Status: COMPLETED | COMMUNITY
Start: 2021-06-21 | End: 2022-01-31

## 2022-01-31 NOTE — DISCUSSION/SUMMARY
[FreeTextEntry1] : pt to return for vulvar biopsy\par discussed recommendation to rule out lichen sclerosus

## 2022-01-31 NOTE — PHYSICAL EXAM
[Appropriately responsive] : appropriately responsive [Alert] : alert [No Acute Distress] : no acute distress [Soft] : soft [Non-tender] : non-tender [Non-distended] : non-distended [No Lesions] : no lesions [No Mass] : no mass [Oriented x3] : oriented x3 [Examination Of The Breasts] : a normal appearance [No Masses] : no breast masses were palpable [Labia Majora] : normal [Labia Minora] : normal [IUD String] : an IUD string was noted [Normal] : normal [Uterine Adnexae] : normal [FreeTextEntry2] : vulvar discoloration - pt notes she does have itching there

## 2022-01-31 NOTE — H&P PST ADULT - CIGARETTES, NUMBER OF YRS
Cardiology Clinic New Patient Visit  [facility]  [address] [place] [id number]  Dept Phone: [phone]    Jing Collado  : 1967  PCP: Milady Hendrix MD      History of Present Illness:   Jing Collado is a 55 year old female with a past medical history significant for recent COVID infection with hospitalization who presents as a follow up from the ER with complaints of dizziness with a fall.  She says she woke up from sleep and got up, then became very dizzy and fell and hit her head on the coffee table.  She did not feel like the room was spinning.  Just felt very lightheaded.  Did not loose consciousness.  Did not have any chest pain.  She was discharged from the hospital 13 days prior to her presentation to the ER.  Her limited Echo shows EF 47%.  She was started on nifidepine 30mg daily while in the hospital.  She has never been on BP medication in the past and says she has never had elevated BP.  Today in the office her BP is 119/76.  She has not taken her BP medication since yesterday early morning - greater than 24hrs prior.  She recently bought a home BP machine, but has not used it yet because she does not have batteries.          Given symptoms, risk factors, and history, we will send for complete echocardiogram and 30 day event placement today    LABS AND DATA:     I personally reviewed all available cardiac diagnostic studies, EKGs, lab work, imaging, and clinic notes        Medications and allergies were reviewed, and the list below is accurate:  Current Outpatient Medications   Medication Sig Dispense Refill   • acyclovir (ZOVIRAX) 200 MG capsule Take 2 capsules by mouth every 12 hours for 20 days. 80 capsule 0   • albuterol 108 (90 Base) MCG/ACT inhaler Inhale 2 puffs into the lungs Every 6 hours as needed for Shortness of Breath or Wheezing (per bdp, per agp guidelines). 1 each 0   • ferrous sulfate 325 (65 FE) MG tablet Take 1 tablet by  mouth 2 times daily (with meals). 60 tablet 0     No current facility-administered medications for this visit.     Current Outpatient Medications   Medication Sig Dispense Refill   • acyclovir (ZOVIRAX) 200 MG capsule Take 2 capsules by mouth every 12 hours for 20 days. 80 capsule 0   • albuterol 108 (90 Base) MCG/ACT inhaler Inhale 2 puffs into the lungs Every 6 hours as needed for Shortness of Breath or Wheezing (per bdp, per agp guidelines). 1 each 0   • ferrous sulfate 325 (65 FE) MG tablet Take 1 tablet by mouth 2 times daily (with meals). 60 tablet 0     No current facility-administered medications for this visit.     Allergies:  ALLERGIES:  No Known Allergies    PAST MEDICAL HISTORY:     Past Medical History:   Diagnosis Date   • COVID-19    • Essential (primary) hypertension        PAST SURGICAL HISTORY:   No past surgical history on file.    Family History:   No family history on file.    Social History:    reports that she has never smoked. She has never used smokeless tobacco. She reports that she does not drink alcohol.    [facility], Family Hx, Social Hx, Allergies, and Medications were reviewed with the patient and updated during today's visit.     In addition, I discussed medication dosage, usage, goals of therapy, and side effects with her. Medication reconciliation was done but medications not prescribed by me may be inaccurate.    The patient's previous laboratory and cardiac diagnostic testing listed below has been reviewed and was utilized to establish my current plan of care.    Previous outside notes were reviewed and taken into consideration when deciding further treatment.       Review of Systems:  A 12-point Review of Systems was performed and is negative except for HPI      EXAM:     Visit Vitals  /73 (BP Location: LUE - Left upper extremity, Patient Position: Sitting)   Pulse 87     Physical Exam  HENT:      Head: Normocephalic.   Eyes:      Conjunctiva/sclera: Conjunctivae  normal.   Neck:      Vascular: No JVD.   Cardiovascular:      Rate and Rhythm: Normal rate and regular rhythm.      Pulses: Normal pulses.      Heart sounds: Normal heart sounds. No murmur heard.  Pulmonary:      Effort: Pulmonary effort is normal. No respiratory distress.      Breath sounds: Normal breath sounds. No wheezing or rales.   Abdominal:      General: Bowel sounds are normal. There is no distension.      Palpations: Abdomen is soft.      Tenderness: There is no abdominal tenderness.   Musculoskeletal:         General: Normal range of motion.      Cervical back: Neck supple.      Right lower leg: No edema.      Left lower leg: No edema.   Skin:     General: Skin is warm and dry.   Neurological:      Mental Status: She is alert and oriented to person, place, and time.   Psychiatric:         Mood and Affect: Affect normal.         Judgment: Judgment normal.           ASSESSMENT AND RECOMMENDATIONS:     Diagnosis:  1. Syncope and collapse    2. Essential hypertension, benign    3. Abnormal electrocardiogram (ECG) (EKG)        Echo shows EF 66%, G1DD, trivial MR, mild TR    BP is normal without any BP medication on board.    Will stop nifedipine for now and will recheck BP in 2 weeks.    Pt may have been hypotensive which could have contributed to the fall    30 day event monitor in place.    Appt scheduled with Dr Pate to discuss event results      No orders of the defined types were placed in this encounter.    Cardiovascular Diagnostic Studies:  LAST EKG:    Encounter Date: 01/27/22   Electrocardiogram 12-Lead   Result Value    Ventricular Rate EKG/Min (BPM) 76    Atrial Rate (BPM) 76    CT-Interval (MSEC) 134    QRS-Interval (MSEC) 80    QT-Interval (MSEC) 380    QTc 427    P Axis (Degrees) 53    R Axis (Degrees) 21    T Axis (Degrees) 70    REPORT TEXT      Normal sinus rhythm  Nonspecific ST and T wave abnormality  Abnormal ECG  When compared with ECG of  27-JAN-2022 10:12,  No significant change was  found         LAST ECHO/ECHO STRESS:  Results for orders placed during the hospital encounter of 22    TRANSTHORACIC ECHO (TTE) COMPLETE W/ W/O IMAGING AGENT    Impression  *[facility]*  [address] [address] [phone]  Transthoracic Echocardiogram (TTE)    Patient: Jing Collado    Study Date/Time:      2022 8:12AM  MRN:     [id number]            FIN#:                 [id number]  :     1967         Ht/Wt:                149.9cm 90.7kg  Age:     55                 BSA/BMI:              1.84m^2 40.4kg/m^2  Gender:  F                  Baseline BP:          129 / 82  Ordering Physician:   Lia Peña    Referring Physician:  Lia Peña    Attending Physician:  Lia Peña    Diagnostic Physician: Tereso Kaiser MD  Sonographer:          Monica Levin RDCS    --------------------------------------------------------------------------  INDICATIONS:   Syncope w/fall.    --------------------------------------------------------------------------  STUDY CONCLUSIONS  SUMMARY:    1. Left ventricle: The cavity size is normal. Wall thickness is mildly  increased. The ejection fraction was measured by single plane method of  disks. Doppler parameters are consistent with abnormal left ventricular  relaxation (grade 1 diastolic dysfunction). The ejection fraction is  66%.  2. Aortic valve: Transvalvular velocity is within the normal range. There  is no stenosis. No regurgitation.  3. Mitral valve: Transvalvular velocity is within the normal range. There  is no evidence for stenosis. Trivial regurgitation.  4. Right ventricle: The cavity size is normal. Systolic function is  normal.  5. Tricuspid valve: Mild regurgitation.  6. Inferior vena cava: The vessel is normal in size. The respirophasic  diameter changes are in the normal range (greater than or equal to  50%).  7. Pericardium, extracardiac: There is no pericardial  effusion.    --------------------------------------------------------------------------  STUDY DATA:  Comparison is made to the study of 01/08/2022.  Procedure:  Transthoracic echocardiography was performed. Image quality was good.  M-mode, complete 2D, complete spectral Doppler, and color Doppler.  Study  status:  STAT.  Study completion:  There were no complications.    FINDINGS    LEFT VENTRICLE:  The cavity size is normal. Wall thickness is mildly  increased. Systolic function is normal. Wall motion is normal; there are  no regional wall motion abnormalities.    The ejection fraction was  measured by single plane method of disks. The ejection fraction is 66%.  The tissue Doppler parameters are abnormal. Doppler parameters are  consistent with abnormal left ventricular relaxation (grade 1 diastolic  dysfunction).    AORTIC VALVE:  The annulus is normal-sized and normal. The valve leaflets  were not well visualized. The leaflets are normal thickness.  Doppler:  Transvalvular velocity is within the normal range. There is no stenosis.  No regurgitation.    AORTA:  Aortic root: The aortic root is normal in size.  Ascending aorta: The ascending aorta is normal in size.    MITRAL VALVE:  The annulus is normal-sized. The posterior annulus is  mildly calcified. The leaflets are normal thickness.  Doppler:  Transvalvular velocity is within the normal range. There is no evidence  for stenosis.  Trivial regurgitation.    The peak diastolic gradient is  3mm Hg.    LEFT ATRIUM:  The atrium is normal in size.    RIGHT VENTRICLE:  The cavity size is normal. Systolic function is normal.  The TAPSE is normal, suggestive of normal RV systolic function.    PULMONIC VALVE:   Not well visualized. The annulus is normal-sized.  Doppler:  Transvalvular velocity is within the normal range. There is no  evidence for stenosis.  Trivial regurgitation.    TRICUSPID VALVE:  The annulus is normal-sized. The leaflets are normal  thickness.   Doppler:  Transvalvular velocity is within the normal range.  There is no evidence for stenosis.  Mild regurgitation.    RIGHT ATRIUM:  The atrium is normal in size.    PERICARDIUM:  There is no pericardial effusion.    SYSTEMIC VEINS:  Inferior vena cava: The vessel is normal in size. The respirophasic  diameter changes are in the normal range (greater than or equal to 50%).    BASELINE ECG:   Normal sinus rhythm.    --------------------------------------------------------------------------  Measurements    Left ventricle        Value        01/07/2022 Ref        Right ventricle         Value        01/07/2022 Ref  ALEXANDRE, LAX     (L)      3.6   cm     3.8        3.8 - 5.2  ALEXANDRE, LAX                2.5   cm     2.0        -------  chord                                                    TAPSE, 2D               1.8   cm     ---------- 1.7 -  ESD, LAX              2.4   cm     3.0        2.2 - 3.5                                                  3.1  chord  ALEXANDRE/bsa, LAX (L)      1.9   cm/m^2 1.9        2.3 - 3.1  Left atrium             Value        01/07/2022 Ref  chord                                                    AP dim, ES              2.8   cm     3.6        2.7 -  ESD/bsa, LAX          1.3   cm/m^2 1.5        1.3 - 2.1                                                  3.8  chord                                                    AP dim index            1.5   cm/m^2 1.8        1.5 -  PW, ED, LAX  (H)      1.2   cm     1.1        0.6 - 0.9                                                  2.3  ALEXANDRE major             7.8   cm     6.8        ---------  Area ES, A4C            14    cm^2   14         <=20  ax, A4C                                                  Area ES, A2C            18    cm^2   13         -------  ESD major             6.6   cm     6.2        ---------  Vol, S                  41    ml     28         22 - 52  ax, A4C                                                  Vol/bsa, S              22     ml/m^2 14         16 - 34  FS major              15    %      9          ---------  Vol, ES, 1-p A4C        32    ml     29         22 - 52  axis, A4C                                                Vol/bsa, ES, 1-p        17    ml/m^2 15         11 - 40  ALEXANDRE/bsa               4.2   cm/m^2 3.4        ---------  A4C  major ax,                                                Vol, ES, 1-p A2C        48    ml     26         22 - 52  A4C                                                      Vol/bsa, ES, 1-p        26    ml/m^2 13         13 - 40  ESD/bsa               3.6   cm/m^2 3.1        ---------  A2C  major ax,                                                Vol, ES, 2-p            41    ml     29         -------  A4C                                                      Vol/bsa, ES, 2-p        22    ml/m^2 14         16 - 34  LOUIS, A4C              24.1  cm^2   21.3       ---------  BABATUNDE, A4C              12.6  cm^2   14.5       ---------  Mitral valve            Value        01/07/2022 Ref  FAC, A4C              48    %      32         ---------  Peak E                  0.81  m/sec  ---------- -------  PW, ED       (H)      1.2   cm     1.1        0.6 - 0.9  Peak A                  1.14  m/sec  ---------- -------  IVS/PW, ED            0.99         1.28       ---------  Decel time              271   ms     ---------- -------  EDV                   46    ml     55         46 - 106   Peak grad, D            3     mm Hg  ---------- -------  ESV          (L)      13    ml     27         14 - 42    Peak E/A ratio          0.7          ---------- -------  EF                    66    %      47         54 - 74  SV                    34    ml     27         ---------  Ascending aorta         Value        01/07/2022 Ref  EDV/bsa      (L)      25    ml/m^2 28         29 - 61    AAo AP diam, ED         2.5   cm     ---------- 1.9 -  ESV/bsa      (L)      7     ml/m^2 13         8 - 24                                                      3.5  SV/bsa                19    ml/m^2 14         ---------  AAo AP diam/bsa,        1.4   cm/m^2 ---------- 1.0 -  SV, 1-p A4C           40    ml     27         ---------  ED                                              2.2  SV/bsa, 1-p           22    ml/m^2 13         ---------  A4C                                                      Systemic veins          Value        01/07/2022 Ref  ESV, 2-p              21    ml     29         14 - 42    Estimated CVP           8     mm Hg  ---------- -------  ESV/bsa, 2-p          11    ml/m^2 14         8 - 24  E', lat marlene, (L)      7.21  cm/sec ---------- >=10  TDI  E/e', lat             11           ---------- ---------  marlene, TDI  E', med marlene, (L)      5.56  cm/sec ---------- >=7  TDI  E/e', med             14           ---------- ---------  marlene, TDI  E', avg, TDI          6.385 cm/sec ---------- ---------  E/e', avg,            13           ---------- <=14  TDI    Ventricular septum    Value        01/07/2022 Ref  IVS, ED      (H)      1.2   cm     1.4        0.6 - 0.9  Legend:  (L)  and  (H)  iris values outside specified reference range.    Prepared and electronically signed by  Tereso Kaiser MD  01/31/2022 08:53      CATH REPORT:  No results found for this or any previous visit.    STRESS TEST:   No results found for this or any previous visit.    NUCLEAR STRESS TEST:   No results found for this or any previous visit.    Labs:  Recent Labs     01/12/22  0612 01/14/22  0559 01/27/22  1018   AST 28 27 29   SODIUM 136 136 138   CHLORIDE 107 107 107   BUN 30* 21* 12   BCRAT 45* 36* 16   POTASSIUM 4.1 3.7 4.2   GLUCOSE 103* 105* 115*   CREATININE 0.66 0.58 0.75   CALCIUM 8.7 8.7 9.4       Recent Labs     01/12/22  0612 01/14/22  0559 01/27/22  1018   WBC 6.7 7.6 7.3   RBC 6.35* 6.54* 5.16   HGB 14.6 15.0 12.0   HCT 46.9* 48.4* 39.7   MCV 73.9* 74.0* 76.9*   MCHC 31.1* 31.0* 30.2*   RDWCV 16.2* 16.9* 17.2*    393 190   TLYMPH 10 11 13         Thank you Milady  MD Simeon for allowing me to see this patient in our office. Please call our office with any questions.     Lia Peña NP  [facility]   20

## 2022-02-03 LAB — HPV HIGH+LOW RISK DNA PNL CVX: NOT DETECTED

## 2022-02-04 LAB — CYTOLOGY CVX/VAG DOC THIN PREP: ABNORMAL

## 2022-02-10 ENCOUNTER — APPOINTMENT (OUTPATIENT)
Dept: OBGYN | Facility: CLINIC | Age: 52
End: 2022-02-10

## 2022-02-24 ENCOUNTER — NON-APPOINTMENT (OUTPATIENT)
Age: 52
End: 2022-02-24

## 2022-02-24 DIAGNOSIS — R93.89 ABNORMAL FINDINGS ON DIAGNOSTIC IMAGING OF OTHER SPECIFIED BODY STRUCTURES: ICD-10-CM

## 2022-03-07 ENCOUNTER — APPOINTMENT (OUTPATIENT)
Dept: OBGYN | Facility: CLINIC | Age: 52
End: 2022-03-07
Payer: COMMERCIAL

## 2022-03-07 DIAGNOSIS — N90.89 OTHER SPECIFIED NONINFLAMMATORY DISORDERS OF VULVA AND PERINEUM: ICD-10-CM

## 2022-03-07 PROCEDURE — 56605 BIOPSY OF VULVA/PERINEUM: CPT

## 2022-03-07 NOTE — PROCEDURE
[Vulvar Biopsy] : Vulvar Biopsy [] : on the right labia minora [Size of Biopsy Taken: ___ (mm)] : [unfilled]Umm [Local Anesthesia] : local anesthesia [Sent to Pathology] : placed in buffered formalin and sent for pathology [Punch] : punch biopsy [Silver Nitrate] : silver nitrate [Tolerated Well] : the patient tolerated the procedure well [No Complications] : there were no complications

## 2022-03-14 LAB — CORE LAB BIOPSY: NORMAL

## 2022-03-15 ENCOUNTER — NON-APPOINTMENT (OUTPATIENT)
Age: 52
End: 2022-03-15

## 2023-01-17 NOTE — ED PROVIDER NOTE - NS ED ROS FT
yes
Constitutional: - Fever, - Chills, - Anorexia, - Fatigue, - Night sweats  Eyes: - Discharge, - Irritation, - Redness, - Visual changes, - Light sensitivity, - Pain  EARS: - Ear Pain, - Tinnitus, - Decreased hearing  NOSE: - Congestion, - Bloody nose  MOUTH/THROAT: - Vocal Changes, - Drooling, - Sore throat  NECK: - Lumps, - Stiffness, - Pain  CV: - Palpitations, - Chest Pain, - Edema, - Syncope  RESP:  - Cough, - Shortness of Breath, - Dyspnea on Exertion, - Trouble speaking, - Pleuritic pain - Wheezing  GI: + Diarrhea, - Constipation, - Bloody stools, + Nausea, - Vomiting, + Abdominal Pain  : - Dysuria, -Frequency, - Hematuria, - Hesitancy, - Incontinence, - Saddle Anesthesia, - Abnormal discharge  MSK: - Myalgias, - Arthralgias, - Weakness, - Deformities, - Injuries  SKIN: - Color change, - Rash, - Swelling, - Ecchymosis, - Abrasion, - laceration  NEURO: - Change in behavior, - Dec. Alertness, - Headache, - Dizziness, - Change in speech, - Weakness, - Seizure-like activity, - Difficulty ambulating

## 2023-07-20 ENCOUNTER — APPOINTMENT (OUTPATIENT)
Dept: OBGYN | Facility: CLINIC | Age: 53
End: 2023-07-20
Payer: COMMERCIAL

## 2023-07-20 VITALS
SYSTOLIC BLOOD PRESSURE: 109 MMHG | WEIGHT: 157 LBS | BODY MASS INDEX: 29.64 KG/M2 | HEIGHT: 61 IN | DIASTOLIC BLOOD PRESSURE: 75 MMHG

## 2023-07-20 PROCEDURE — 99396 PREV VISIT EST AGE 40-64: CPT

## 2023-07-20 NOTE — HISTORY OF PRESENT ILLNESS
[TextBox_4] : 51yo presents for annual exam\par has mirena IUD since 6/2019\par lost 47lb - keto/IF/clean eating \par has hot flashes \par recently had UTI 2 weeks ago - feels better after abx  [Mammogramdate] : 2023 per pt  [PapSmeardate] : 2022 [ColonoscopyDate] : due to go

## 2023-07-20 NOTE — PHYSICAL EXAM
[Chaperone Present] : A chaperone was present in the examining room during all aspects of the physical examination [Appropriately responsive] : appropriately responsive [Alert] : alert [No Acute Distress] : no acute distress [Soft] : soft [Non-tender] : non-tender [Non-distended] : non-distended [No Lesions] : no lesions [No Mass] : no mass [Oriented x3] : oriented x3 [Examination Of The Breasts] : a normal appearance [No Masses] : no breast masses were palpable [Labia Majora] : normal [Labia Minora] : normal [IUD String] : an IUD string was noted [Normal] : normal [Uterine Adnexae] : normal [FreeTextEntry2] : vulvar discoloration

## 2023-07-21 ENCOUNTER — NON-APPOINTMENT (OUTPATIENT)
Age: 53
End: 2023-07-21

## 2023-07-24 LAB
CYTOLOGY CVX/VAG DOC THIN PREP: NORMAL
HPV HIGH+LOW RISK DNA PNL CVX: NOT DETECTED

## 2023-10-31 NOTE — H&P PST ADULT - LAST STRESS TEST
Caller: Geni Rea    Relationship: Self    Best call back number: [phone]    What medication are you requesting: ANTIBIOTIC    What are your current symptoms: CLOUDY ODOROUS URINE, ITCHY, FREQUENCY, BURNING    How long have you been experiencing symptoms: 2 DAYS    Have you had these symptoms before:    [] Yes  [x] No    Have you been treated for these symptoms before:   [] Yes  [x] No    If a prescription is needed, what is your preferred pharmacy and phone number: [id number] [place] [address] [place] [address] - [phone]  - [phone]     Additional notes:    PLEASE CALL TO CONFIRM OR DISCUSS.          denies

## 2024-06-17 ENCOUNTER — APPOINTMENT (OUTPATIENT)
Dept: OBGYN | Facility: CLINIC | Age: 54
End: 2024-06-17
Payer: COMMERCIAL

## 2024-06-17 VITALS
HEIGHT: 61 IN | WEIGHT: 182 LBS | DIASTOLIC BLOOD PRESSURE: 76 MMHG | SYSTOLIC BLOOD PRESSURE: 116 MMHG | BODY MASS INDEX: 34.36 KG/M2

## 2024-06-17 DIAGNOSIS — Z01.419 ENCOUNTER FOR GYNECOLOGICAL EXAMINATION (GENERAL) (ROUTINE) W/OUT ABNORMAL FINDINGS: ICD-10-CM

## 2024-06-17 PROCEDURE — 99396 PREV VISIT EST AGE 40-64: CPT

## 2024-06-17 NOTE — PHYSICAL EXAM
[Chaperone Present] : A chaperone was present in the examining room during all aspects of the physical examination [[id number]] : A chaperone was present during the pelvic exam. [FreeTextEntry2] : Rina YEBOAH [Appropriately responsive] : appropriately responsive [Alert] : alert [No Acute Distress] : no acute distress [Soft] : soft [Non-tender] : non-tender [Non-distended] : non-distended [No Lesions] : no lesions [No Mass] : no mass [Oriented x3] : oriented x3 [Examination Of The Breasts] : a normal appearance [No Masses] : no breast masses were palpable [Labia Majora] : normal [Labia Minora] : normal [Normal] : normal [Uterine Adnexae] : normal

## 2024-06-17 NOTE — HISTORY OF PRESENT ILLNESS
[Patient reported mammogram was normal] : Patient reported mammogram was normal [Patient reported breast sonogram was normal] : Patient reported breast sonogram was normal [Patient reported PAP Smear was normal] : Patient reported PAP Smear was normal [FreeTextEntry1] : 52 y/o G0 female, LMP: amenorrhea 2/2 IUD, presents for annual GYN visit.  Patient denies any GYN complaints.  Sexual Activity: previously sexually active. Social/Mental Health: reports social ETOH, denies tobacco or any illicit drug use.  Denies depression, anxiety, thoughts of personal harm or suicidal ideation.  ROS:  Denies fever/chills, HA, Cough/sore throat, CP, SOB, N/V, Diarrhea/Constipation, Pelvic pain, Urinary frequency/urgency/incontinence, irregular vaginal bleeding, discharge or irritation.    Medical History GYN HX: HPV, IUD PMH: denies PSH: fistula repair x2 Meds: none Allergies: NKDA [Mammogramdate] : 2/2022 [BreastSonogramDate] : 2/2022 [PapSmeardate] : 7/202/23 [Patient refuses STI testing] : Patient refuses STI testing

## 2024-06-18 LAB — HPV HIGH+LOW RISK DNA PNL CVX: NOT DETECTED

## 2024-06-28 LAB — CYTOLOGY CVX/VAG DOC THIN PREP: NORMAL

## 2025-03-21 NOTE — H&P ADULT - PSH
Health Maintenance       Medicare Advantage- Medicare Wellness Visit (Yearly - January to December)  Due since 1/1/2025    Diabetes A1C (Every 6 Months)  Due soon on 3/25/2025    COVID-19 Vaccine (7 - 2024-25 season)  Due soon on 3/25/2025    Diabetes Eye Exam (Yearly)  Due soon on 3/28/2025           Following review of the above:  Patient wishes to discuss with clinician: Diabetes A1C and COVID-19    Note: Refer to final orders and clinician documentation.        Anal fistula  fistulotomy 1999

## 2025-06-30 ENCOUNTER — APPOINTMENT (OUTPATIENT)
Dept: OBGYN | Facility: CLINIC | Age: 55
End: 2025-06-30
Payer: COMMERCIAL

## 2025-06-30 VITALS
WEIGHT: 189 LBS | BODY MASS INDEX: 35.68 KG/M2 | SYSTOLIC BLOOD PRESSURE: 122 MMHG | HEIGHT: 61 IN | DIASTOLIC BLOOD PRESSURE: 90 MMHG

## 2025-06-30 PROCEDURE — 99396 PREV VISIT EST AGE 40-64: CPT

## 2025-06-30 PROCEDURE — 99459 PELVIC EXAMINATION: CPT

## 2025-07-01 LAB
25(OH)D3 SERPL-MCNC: 39.2 NG/ML
ESTRADIOL SERPL-MCNC: <5 PG/ML
FSH SERPL-MCNC: 46.9 IU/L
HPV HIGH+LOW RISK DNA PNL CVX: NOT DETECTED
LH SERPL-ACNC: 35.7 IU/L
TSH SERPL-ACNC: 2.08 UIU/ML

## 2025-07-02 LAB — CYTOLOGY CVX/VAG DOC THIN PREP: NORMAL
